# Patient Record
Sex: MALE | Race: WHITE | Employment: OTHER | RURAL
[De-identification: names, ages, dates, MRNs, and addresses within clinical notes are randomized per-mention and may not be internally consistent; named-entity substitution may affect disease eponyms.]

---

## 2017-01-01 ENCOUNTER — LAB ONLY (OUTPATIENT)
Dept: FAMILY MEDICINE CLINIC | Age: 82
End: 2017-01-01

## 2017-01-01 ENCOUNTER — OFFICE VISIT (OUTPATIENT)
Dept: FAMILY MEDICINE CLINIC | Age: 82
End: 2017-01-01

## 2017-01-01 ENCOUNTER — CLINICAL SUPPORT (OUTPATIENT)
Dept: FAMILY MEDICINE CLINIC | Age: 82
End: 2017-01-01

## 2017-01-01 ENCOUNTER — TELEPHONE (OUTPATIENT)
Dept: FAMILY MEDICINE CLINIC | Age: 82
End: 2017-01-01

## 2017-01-01 VITALS
TEMPERATURE: 97.4 F | WEIGHT: 150.8 LBS | RESPIRATION RATE: 16 BRPM | BODY MASS INDEX: 22.33 KG/M2 | HEIGHT: 69 IN | SYSTOLIC BLOOD PRESSURE: 100 MMHG | OXYGEN SATURATION: 99 % | HEART RATE: 89 BPM | DIASTOLIC BLOOD PRESSURE: 70 MMHG

## 2017-01-01 VITALS
DIASTOLIC BLOOD PRESSURE: 56 MMHG | HEART RATE: 90 BPM | RESPIRATION RATE: 16 BRPM | OXYGEN SATURATION: 98 % | SYSTOLIC BLOOD PRESSURE: 90 MMHG | WEIGHT: 151 LBS | BODY MASS INDEX: 22.3 KG/M2

## 2017-01-01 VITALS
HEART RATE: 95 BPM | WEIGHT: 149 LBS | OXYGEN SATURATION: 98 % | HEIGHT: 69 IN | BODY MASS INDEX: 22.07 KG/M2 | RESPIRATION RATE: 16 BRPM | SYSTOLIC BLOOD PRESSURE: 146 MMHG | DIASTOLIC BLOOD PRESSURE: 87 MMHG | TEMPERATURE: 96.2 F

## 2017-01-01 VITALS
OXYGEN SATURATION: 98 % | HEART RATE: 72 BPM | WEIGHT: 170 LBS | RESPIRATION RATE: 16 BRPM | SYSTOLIC BLOOD PRESSURE: 132 MMHG | DIASTOLIC BLOOD PRESSURE: 76 MMHG | BODY MASS INDEX: 25.1 KG/M2

## 2017-01-01 DIAGNOSIS — E11.9 TYPE 2 DIABETES MELLITUS WITHOUT COMPLICATION, WITHOUT LONG-TERM CURRENT USE OF INSULIN (HCC): Primary | ICD-10-CM

## 2017-01-01 DIAGNOSIS — E78.2 MIXED HYPERLIPIDEMIA: ICD-10-CM

## 2017-01-01 DIAGNOSIS — I10 ESSENTIAL HYPERTENSION: ICD-10-CM

## 2017-01-01 DIAGNOSIS — F32.89 OTHER DEPRESSION: ICD-10-CM

## 2017-01-01 DIAGNOSIS — R35.0 URINE FREQUENCY: Primary | ICD-10-CM

## 2017-01-01 DIAGNOSIS — R63.4 WEIGHT LOSS: ICD-10-CM

## 2017-01-01 DIAGNOSIS — E83.52 HYPERCALCEMIA: Primary | ICD-10-CM

## 2017-01-01 DIAGNOSIS — E11.9 TYPE 2 DIABETES MELLITUS WITHOUT COMPLICATION, WITHOUT LONG-TERM CURRENT USE OF INSULIN (HCC): ICD-10-CM

## 2017-01-01 DIAGNOSIS — Z23 ENCOUNTER FOR IMMUNIZATION: ICD-10-CM

## 2017-01-01 DIAGNOSIS — G30.1 LATE ONSET ALZHEIMER'S DISEASE WITH BEHAVIORAL DISTURBANCE (HCC): ICD-10-CM

## 2017-01-01 DIAGNOSIS — F02.818 LATE ONSET ALZHEIMER'S DISEASE WITH BEHAVIORAL DISTURBANCE (HCC): ICD-10-CM

## 2017-01-01 DIAGNOSIS — I25.119 CORONARY ARTERY DISEASE WITH ANGINA PECTORIS, UNSPECIFIED VESSEL OR LESION TYPE, UNSPECIFIED WHETHER NATIVE OR TRANSPLANTED HEART (HCC): ICD-10-CM

## 2017-01-01 LAB
ALBUMIN SERPL ELPH-MCNC: 3.9 G/DL (ref 2.9–4.4)
ALBUMIN SERPL-MCNC: 4.1 G/DL (ref 3.5–4.7)
ALBUMIN SERPL-MCNC: 4.5 G/DL (ref 3.5–4.7)
ALBUMIN SERPL-MCNC: 4.5 G/DL (ref 3.5–4.7)
ALBUMIN/GLOB SERPL: 1.4 {RATIO} (ref 0.7–1.7)
ALBUMIN/GLOB SERPL: 1.6 {RATIO} (ref 1.2–2.2)
ALBUMIN/GLOB SERPL: 1.9 {RATIO} (ref 1.2–2.2)
ALBUMIN/GLOB SERPL: 2 {RATIO} (ref 1.2–2.2)
ALP SERPL-CCNC: 116 IU/L (ref 39–117)
ALP SERPL-CCNC: 122 IU/L (ref 39–117)
ALP SERPL-CCNC: 48 IU/L (ref 39–117)
ALPHA1 GLOB SERPL ELPH-MCNC: 0.3 G/DL (ref 0–0.4)
ALPHA2 GLOB SERPL ELPH-MCNC: 0.8 G/DL (ref 0.4–1)
ALT SERPL-CCNC: 16 IU/L (ref 0–44)
ALT SERPL-CCNC: 20 IU/L (ref 0–44)
ALT SERPL-CCNC: 29 IU/L (ref 0–44)
AST SERPL-CCNC: 15 IU/L (ref 0–40)
AST SERPL-CCNC: 20 IU/L (ref 0–40)
AST SERPL-CCNC: 23 IU/L (ref 0–40)
B-GLOBULIN SERPL ELPH-MCNC: 1 G/DL (ref 0.7–1.3)
BASOPHILS # BLD AUTO: 0 X10E3/UL (ref 0–0.2)
BASOPHILS # BLD AUTO: 0 X10E3/UL (ref 0–0.2)
BASOPHILS NFR BLD AUTO: 0 %
BASOPHILS NFR BLD AUTO: 1 %
BILIRUB SERPL-MCNC: 0.4 MG/DL (ref 0–1.2)
BILIRUB SERPL-MCNC: 0.5 MG/DL (ref 0–1.2)
BILIRUB SERPL-MCNC: 0.7 MG/DL (ref 0–1.2)
BILIRUB UR QL STRIP: NEGATIVE
BUN SERPL-MCNC: 13 MG/DL (ref 8–27)
BUN SERPL-MCNC: 13 MG/DL (ref 8–27)
BUN SERPL-MCNC: 15 MG/DL (ref 8–27)
BUN/CREAT SERPL: 13 (ref 10–24)
BUN/CREAT SERPL: 14 (ref 10–24)
BUN/CREAT SERPL: 15 (ref 10–24)
CALCIUM SERPL-MCNC: 10.3 MG/DL (ref 8.6–10.2)
CALCIUM SERPL-MCNC: 10.3 MG/DL (ref 8.6–10.2)
CALCIUM SERPL-MCNC: 10.7 MG/DL (ref 8.6–10.2)
CHLORIDE SERPL-SCNC: 96 MMOL/L (ref 96–106)
CHLORIDE SERPL-SCNC: 97 MMOL/L (ref 96–106)
CHLORIDE SERPL-SCNC: 98 MMOL/L (ref 96–106)
CHOLEST SERPL-MCNC: 236 MG/DL (ref 100–199)
CHOLEST SERPL-MCNC: 244 MG/DL (ref 100–199)
CO2 SERPL-SCNC: 25 MMOL/L (ref 18–29)
CO2 SERPL-SCNC: 26 MMOL/L (ref 18–29)
CO2 SERPL-SCNC: 26 MMOL/L (ref 18–29)
CREAT SERPL-MCNC: 0.94 MG/DL (ref 0.76–1.27)
CREAT SERPL-MCNC: 1 MG/DL (ref 0.76–1.27)
CREAT SERPL-MCNC: 1.01 MG/DL (ref 0.76–1.27)
EOSINOPHIL # BLD AUTO: 0 X10E3/UL (ref 0–0.4)
EOSINOPHIL # BLD AUTO: 0.1 X10E3/UL (ref 0–0.4)
EOSINOPHIL NFR BLD AUTO: 1 %
EOSINOPHIL NFR BLD AUTO: 1 %
ERYTHROCYTE [DISTWIDTH] IN BLOOD BY AUTOMATED COUNT: 14.3 % (ref 12.3–15.4)
ERYTHROCYTE [DISTWIDTH] IN BLOOD BY AUTOMATED COUNT: 14.4 % (ref 12.3–15.4)
ERYTHROCYTE [SEDIMENTATION RATE] IN BLOOD BY WESTERGREN METHOD: 5 MM/HR (ref 0–30)
EST. AVERAGE GLUCOSE BLD GHB EST-MCNC: 105 MG/DL
EST. AVERAGE GLUCOSE BLD GHB EST-MCNC: 117 MG/DL
GAMMA GLOB SERPL ELPH-MCNC: 0.6 G/DL (ref 0.4–1.8)
GFR SERPLBLD CREATININE-BSD FMLA CKD-EPI: 69 ML/MIN/1.73
GFR SERPLBLD CREATININE-BSD FMLA CKD-EPI: 75 ML/MIN/1.73
GFR SERPLBLD CREATININE-BSD FMLA CKD-EPI: 80 ML/MIN/1.73
GFR SERPLBLD CREATININE-BSD FMLA CKD-EPI: 87 ML/MIN/1.73
GLOBULIN SER CALC-MCNC: 2.3 G/DL (ref 1.5–4.5)
GLOBULIN SER CALC-MCNC: 2.4 G/DL (ref 1.5–4.5)
GLOBULIN SER CALC-MCNC: 2.6 G/DL (ref 1.5–4.5)
GLOBULIN SER-MCNC: 2.8 G/DL (ref 2.2–3.9)
GLUCOSE SERPL-MCNC: 159 MG/DL (ref 65–99)
GLUCOSE SERPL-MCNC: 164 MG/DL (ref 65–99)
GLUCOSE SERPL-MCNC: 94 MG/DL (ref 65–99)
GLUCOSE UR-MCNC: NEGATIVE MG/DL
HBA1C MFR BLD: 5.3 % (ref 4.8–5.6)
HBA1C MFR BLD: 5.7 % (ref 4.8–5.6)
HCT VFR BLD AUTO: 38.9 % (ref 37.5–51)
HCT VFR BLD AUTO: 39.9 % (ref 37.5–51)
HDLC SERPL-MCNC: 39 MG/DL
HDLC SERPL-MCNC: 42 MG/DL
HGB BLD-MCNC: 12.8 G/DL (ref 12.6–17.7)
HGB BLD-MCNC: 13.8 G/DL (ref 12.6–17.7)
IGA SERPL-MCNC: 147 MG/DL (ref 61–437)
IGG SERPL-MCNC: 667 MG/DL (ref 700–1600)
IGM SERPL-MCNC: 31 MG/DL (ref 15–143)
IMM GRANULOCYTES # BLD: 0 X10E3/UL (ref 0–0.1)
IMM GRANULOCYTES # BLD: 0 X10E3/UL (ref 0–0.1)
IMM GRANULOCYTES NFR BLD: 1 %
IMM GRANULOCYTES NFR BLD: 1 %
INTERPRETATION SERPL IEP-IMP: ABNORMAL
KAPPA LC FREE SER-MCNC: 17.3 MG/L (ref 3.3–19.4)
KAPPA LC FREE/LAMBDA FREE SER: 1.38 {RATIO} (ref 0.26–1.65)
KETONES P FAST UR STRIP-MCNC: NEGATIVE MG/DL
LAMBDA LC FREE SERPL-MCNC: 12.5 MG/L (ref 5.7–26.3)
LDLC SERPL CALC-MCNC: 165 MG/DL (ref 0–99)
LDLC SERPL CALC-MCNC: 174 MG/DL (ref 0–99)
LYMPHOCYTES # BLD AUTO: 1.1 X10E3/UL (ref 0.7–3.1)
LYMPHOCYTES # BLD AUTO: 1.5 X10E3/UL (ref 0.7–3.1)
LYMPHOCYTES NFR BLD AUTO: 26 %
LYMPHOCYTES NFR BLD AUTO: 32 %
M PROTEIN SERPL ELPH-MCNC: ABNORMAL G/DL
MCH RBC QN AUTO: 29.8 PG (ref 26.6–33)
MCH RBC QN AUTO: 30.3 PG (ref 26.6–33)
MCHC RBC AUTO-ENTMCNC: 32.9 G/DL (ref 31.5–35.7)
MCHC RBC AUTO-ENTMCNC: 34.6 G/DL (ref 31.5–35.7)
MCV RBC AUTO: 88 FL (ref 79–97)
MCV RBC AUTO: 91 FL (ref 79–97)
MONOCYTES # BLD AUTO: 0.5 X10E3/UL (ref 0.1–0.9)
MONOCYTES # BLD AUTO: 0.5 X10E3/UL (ref 0.1–0.9)
MONOCYTES NFR BLD AUTO: 10 %
MONOCYTES NFR BLD AUTO: 12 %
NEUTROPHILS # BLD AUTO: 2.5 X10E3/UL (ref 1.4–7)
NEUTROPHILS # BLD AUTO: 2.6 X10E3/UL (ref 1.4–7)
NEUTROPHILS NFR BLD AUTO: 56 %
NEUTROPHILS NFR BLD AUTO: 59 %
PH UR STRIP: 6 [PH] (ref 4.6–8)
PLATELET # BLD AUTO: 189 X10E3/UL (ref 150–379)
PLATELET # BLD AUTO: 231 X10E3/UL (ref 150–379)
PLEASE NOTE:, 149534: ABNORMAL
POTASSIUM SERPL-SCNC: 4.7 MMOL/L (ref 3.5–5.2)
POTASSIUM SERPL-SCNC: 5 MMOL/L (ref 3.5–5.2)
POTASSIUM SERPL-SCNC: 5.3 MMOL/L (ref 3.5–5.2)
PROT SERPL-MCNC: 6.7 G/DL (ref 6–8.5)
PROT SERPL-MCNC: 6.8 G/DL (ref 6–8.5)
PROT SERPL-MCNC: 6.9 G/DL (ref 6–8.5)
PROT UR QL STRIP: NEGATIVE
RBC # BLD AUTO: 4.3 X10E6/UL (ref 4.14–5.8)
RBC # BLD AUTO: 4.56 X10E6/UL (ref 4.14–5.8)
SODIUM SERPL-SCNC: 139 MMOL/L (ref 134–144)
SODIUM SERPL-SCNC: 141 MMOL/L (ref 134–144)
SODIUM SERPL-SCNC: 141 MMOL/L (ref 134–144)
SP GR UR STRIP: 1.03 (ref 1–1.03)
TRIGL SERPL-MCNC: 147 MG/DL (ref 0–149)
TRIGL SERPL-MCNC: 157 MG/DL (ref 0–149)
TSH SERPL DL<=0.005 MIU/L-ACNC: 2.81 UIU/ML (ref 0.45–4.5)
UA UROBILINOGEN AMB POC: NORMAL (ref 0.2–1)
URINALYSIS CLARITY POC: CLEAR
URINALYSIS COLOR POC: NORMAL
URINE BLOOD POC: NEGATIVE
URINE LEUKOCYTES POC: NEGATIVE
URINE NITRITES POC: NEGATIVE
VLDLC SERPL CALC-MCNC: 29 MG/DL (ref 5–40)
VLDLC SERPL CALC-MCNC: 31 MG/DL (ref 5–40)
WBC # BLD AUTO: 4.4 X10E3/UL (ref 3.4–10.8)
WBC # BLD AUTO: 4.5 X10E3/UL (ref 3.4–10.8)

## 2017-01-01 RX ORDER — DIVALPROEX SODIUM 250 MG/1
250 TABLET, DELAYED RELEASE ORAL 2 TIMES DAILY
Qty: 180 TAB | Refills: 3 | Status: SHIPPED | OUTPATIENT
Start: 2017-01-01 | End: 2017-01-01 | Stop reason: ALTCHOICE

## 2017-01-01 RX ORDER — METFORMIN HYDROCHLORIDE 1000 MG/1
1000 TABLET ORAL DAILY
Qty: 90 TAB | Refills: 4 | Status: SHIPPED | OUTPATIENT
Start: 2017-01-01 | End: 2017-01-01 | Stop reason: ALTCHOICE

## 2017-01-01 RX ORDER — VENLAFAXINE 37.5 MG/1
37.5 TABLET ORAL 2 TIMES DAILY
Qty: 60 TAB | Refills: 6 | Status: SHIPPED | OUTPATIENT
Start: 2017-01-01 | End: 2017-01-01 | Stop reason: DRUGHIGH

## 2017-01-01 RX ORDER — VENLAFAXINE 75 MG/1
75 TABLET ORAL 2 TIMES DAILY
Qty: 60 TAB | Refills: 11 | Status: SHIPPED | OUTPATIENT
Start: 2017-01-01

## 2017-01-01 RX ORDER — GLIMEPIRIDE 1 MG/1
1 TABLET ORAL
Qty: 90 TAB | Refills: 3 | Status: SHIPPED | OUTPATIENT
Start: 2017-01-01 | End: 2017-01-01 | Stop reason: ALTCHOICE

## 2017-01-01 RX ORDER — DIVALPROEX SODIUM 250 MG/1
250 TABLET, DELAYED RELEASE ORAL 2 TIMES DAILY
Qty: 60 TAB | Refills: 11 | Status: SHIPPED | OUTPATIENT
Start: 2017-01-01 | End: 2017-01-01 | Stop reason: SDUPTHER

## 2017-01-01 RX ORDER — VENLAFAXINE 37.5 MG/1
37.5 TABLET ORAL 2 TIMES DAILY
Qty: 60 TAB | Refills: 6 | Status: SHIPPED | OUTPATIENT
Start: 2017-01-01 | End: 2017-01-01 | Stop reason: SDUPTHER

## 2017-01-01 RX ORDER — BISOPROLOL FUMARATE AND HYDROCHLOROTHIAZIDE 5; 6.25 MG/1; MG/1
1 TABLET ORAL DAILY
Qty: 90 TAB | Refills: 4 | Status: SHIPPED | OUTPATIENT
Start: 2017-01-01 | End: 2017-01-01 | Stop reason: ALTCHOICE

## 2017-01-01 RX ORDER — TAMSULOSIN HYDROCHLORIDE 0.4 MG/1
0.4 CAPSULE ORAL DAILY
Qty: 90 CAP | Refills: 4 | Status: SHIPPED | OUTPATIENT
Start: 2017-01-01

## 2017-02-08 ENCOUNTER — LAB ONLY (OUTPATIENT)
Dept: FAMILY MEDICINE CLINIC | Age: 82
End: 2017-02-08

## 2017-02-08 DIAGNOSIS — I10 ESSENTIAL HYPERTENSION: ICD-10-CM

## 2017-02-08 DIAGNOSIS — E11.9 TYPE 2 DIABETES MELLITUS WITHOUT COMPLICATION, WITHOUT LONG-TERM CURRENT USE OF INSULIN (HCC): Primary | ICD-10-CM

## 2017-02-08 DIAGNOSIS — E78.2 MIXED HYPERLIPIDEMIA: ICD-10-CM

## 2017-02-08 NOTE — MR AVS SNAPSHOT
Visit Information Date & Time Provider Department Dept. Phone Encounter #  
 2/8/2017 10:15 AM Yany 32 [id number] Your Appointments 2/10/2017  9:30 AM  
ESTABLISHED PATIENT with MD Clifton Rodas ([facility]) Appt Note: 6 mo f/u  
 1000 [facility] [address] [facility],5[address] [id number] [phone]  
  
   
 [address] [address] Upcoming Health Maintenance Date Due  
 FOOT EXAM Q1 9/15/1945 MICROALBUMIN Q1 9/15/1945 EYE EXAM RETINAL OR DILATED Q1 9/15/1945 GLAUCOMA SCREENING Q2Y 9/15/2000 DTaP/Tdap/Td series (1 - Tdap) 4/2/2013 HEMOGLOBIN A1C Q6M 2/2/2017 MEDICARE YEARLY EXAM 2/5/2017 LIPID PANEL Q1 8/2/2017 Allergies as of 2/8/2017  Review Complete On: 10/14/2016 By: Prisca Urias NP No Known Allergies Current Immunizations  Never Reviewed Name Date Influenza Vaccine 1/14/2017, 10/14/2015 10:02 AM, 10/9/2014 Pneumococcal Conjugate (PCV-13) 2/5/2016 11:03 AM  
 Pneumococcal Vaccine (Unspecified Type) 4/1/2013 Td 4/1/2013 Zoster Vaccine, Live 10/4/2012 Not reviewed this visit You Were Diagnosed With   
  
 Codes Comments Type 2 diabetes mellitus without complication, without long-term current use of insulin (HCC)    -  Primary ICD-10-CM: E11.9 ICD-9-CM: 250.00 Mixed hyperlipidemia     ICD-10-CM: E78.2 ICD-9-CM: 272.2 Essential hypertension     ICD-10-CM: I10 
ICD-9-CM: 401.9 Vitals Smoking Status Former Smoker Preferred Pharmacy Pharmacy Name Phone  [facility] [phone] Your Updated Medication List  
  
   
This list is accurate as of: 2/8/17 11:24 AM.  Always use your most recent med list. amLODIPine 10 mg tablet Commonly known as:  Kip Ray TAKE 1 TABLET DAILY aspirin delayed-release 81 mg tablet Take  by mouth daily. bisoprolol-hydroCHLOROthiazide 5-6.25 mg per tablet Commonly known as:  [facility] Take 1 Tab by mouth daily. cholecalciferol (vitamin D3) 2,000 unit Tab Take  by mouth. dutasteride 0.5 mg capsule Commonly known as:  AVODART TAKE 1 CAPSULE DAILY  
  
 glucose blood VI test strips strip Commonly known as:  ASCENSIA AUTODISC VI, ONE TOUCH ULTRA TEST VI  
100  
  
 metFORMIN 1,000 mg tablet Commonly known as:  GLUCOPHAGE  
TAKE 1 TABLET TWICE DAILY  WITH MEALS  
  
 OMEGA 3 FISH OIL PO Take  by mouth two (2) times a day. Indications: Total Cardio cover  
  
 omeprazole 20 mg capsule Commonly known as:  PRILOSEC Take 20 mg by mouth daily. quinapril 40 mg tablet Commonly known as:  ACCUPRIL Take 1 Tab by mouth daily. sertraline 50 mg tablet Commonly known as:  ZOLOFT Take one and one half tablets daily  
  
 simvastatin 20 mg tablet Commonly known as:  ZOCOR Take 1 Tab by mouth daily. spironolactone 25 mg tablet Commonly known as:  ALDACTONE Take 1 Tab by mouth daily. tamsulosin 0.4 mg capsule Commonly known as:  FLOMAX Take 1 Cap by mouth daily. We Performed the Following CBC WITH AUTOMATED DIFF [63836 CPT(R)] HEMOGLOBIN A1C WITH EAG [35640 CPT(R)] LIPID PANEL [01920 CPT(R)] METABOLIC PANEL, COMPREHENSIVE [32435 CPT(R)] TX COLLECTION VENOUS BLOOD,VENIPUNCTURE [id number] CPT(R)] TX HANDLG&/OR CONVEY OF SPEC FOR TR OFFICE TO LAB [52214 CPT(R)] Patient Instructions If you have any questions regarding [employer], you may call [employer] support at [phone]. Introducing [facility] & HEALTH SERVICES! Dear Katelin Balderas: Thank you for requesting a [employer] account. Our records indicate that you already have an active [employer] account. You can access your account anytime at [email or web address] Did you know that you can access your hospital and ER discharge instructions at any time in [employer]? You can also review all of your test results from your hospital stay or ER visit. Additional Information If you have questions, please visit the Frequently Asked Questions section of the [employer] website at [email or web address] Remember, [employer] is NOT to be used for urgent needs. For medical emergencies, dial 911. Now available from your iPhone and Android! Please provide this summary of care documentation to your next provider. Your primary care clinician is listed as Daisy Palacios. If you have any questions after today's visit, please call [phone].

## 2017-02-09 LAB
ALBUMIN SERPL-MCNC: 4.4 G/DL (ref 3.5–4.7)
ALBUMIN/GLOB SERPL: 1.9 {RATIO} (ref 1.1–2.5)
ALP SERPL-CCNC: 56 IU/L (ref 39–117)
ALT SERPL-CCNC: 32 IU/L (ref 0–44)
AST SERPL-CCNC: 21 IU/L (ref 0–40)
BASOPHILS # BLD AUTO: 0 X10E3/UL (ref 0–0.2)
BASOPHILS NFR BLD AUTO: 1 %
BILIRUB SERPL-MCNC: 0.4 MG/DL (ref 0–1.2)
BUN SERPL-MCNC: 19 MG/DL (ref 8–27)
BUN/CREAT SERPL: 20 (ref 10–22)
CALCIUM SERPL-MCNC: 9.9 MG/DL (ref 8.6–10.2)
CHLORIDE SERPL-SCNC: 99 MMOL/L (ref 96–106)
CHOLEST SERPL-MCNC: 168 MG/DL (ref 100–199)
CO2 SERPL-SCNC: 22 MMOL/L (ref 18–29)
CREAT SERPL-MCNC: 0.97 MG/DL (ref 0.76–1.27)
EOSINOPHIL # BLD AUTO: 0.1 X10E3/UL (ref 0–0.4)
EOSINOPHIL NFR BLD AUTO: 2 %
ERYTHROCYTE [DISTWIDTH] IN BLOOD BY AUTOMATED COUNT: 14.3 % (ref 12.3–15.4)
EST. AVERAGE GLUCOSE BLD GHB EST-MCNC: 154 MG/DL
GLOBULIN SER CALC-MCNC: 2.3 G/DL (ref 1.5–4.5)
GLUCOSE SERPL-MCNC: 188 MG/DL (ref 65–99)
HBA1C MFR BLD: 7 % (ref 4.8–5.6)
HCT VFR BLD AUTO: 39.5 % (ref 37.5–51)
HDLC SERPL-MCNC: 36 MG/DL
HGB BLD-MCNC: 13.1 G/DL (ref 12.6–17.7)
IMM GRANULOCYTES # BLD: 0 X10E3/UL (ref 0–0.1)
IMM GRANULOCYTES NFR BLD: 1 %
LDLC SERPL CALC-MCNC: 92 MG/DL (ref 0–99)
LYMPHOCYTES # BLD AUTO: 1.3 X10E3/UL (ref 0.7–3.1)
LYMPHOCYTES NFR BLD AUTO: 28 %
MCH RBC QN AUTO: 28.6 PG (ref 26.6–33)
MCHC RBC AUTO-ENTMCNC: 33.2 G/DL (ref 31.5–35.7)
MCV RBC AUTO: 86 FL (ref 79–97)
MONOCYTES # BLD AUTO: 0.3 X10E3/UL (ref 0.1–0.9)
MONOCYTES NFR BLD AUTO: 7 %
NEUTROPHILS # BLD AUTO: 2.9 X10E3/UL (ref 1.4–7)
NEUTROPHILS NFR BLD AUTO: 61 %
PLATELET # BLD AUTO: 183 X10E3/UL (ref 150–379)
POTASSIUM SERPL-SCNC: 4.4 MMOL/L (ref 3.5–5.2)
PROT SERPL-MCNC: 6.7 G/DL (ref 6–8.5)
RBC # BLD AUTO: 4.58 X10E6/UL (ref 4.14–5.8)
SODIUM SERPL-SCNC: 139 MMOL/L (ref 134–144)
TRIGL SERPL-MCNC: 202 MG/DL (ref 0–149)
VLDLC SERPL CALC-MCNC: 40 MG/DL (ref 5–40)
WBC # BLD AUTO: 4.7 X10E3/UL (ref 3.4–10.8)

## 2017-02-10 ENCOUNTER — OFFICE VISIT (OUTPATIENT)
Dept: FAMILY MEDICINE CLINIC | Age: 82
End: 2017-02-10

## 2017-02-10 VITALS
DIASTOLIC BLOOD PRESSURE: 68 MMHG | SYSTOLIC BLOOD PRESSURE: 118 MMHG | HEART RATE: 77 BPM | WEIGHT: 185.8 LBS | BODY MASS INDEX: 27.44 KG/M2 | OXYGEN SATURATION: 98 % | RESPIRATION RATE: 17 BRPM

## 2017-02-10 DIAGNOSIS — Z71.89 ADVANCED CARE PLANNING/COUNSELING DISCUSSION: ICD-10-CM

## 2017-02-10 DIAGNOSIS — E11.9 TYPE 2 DIABETES MELLITUS WITHOUT COMPLICATION, WITHOUT LONG-TERM CURRENT USE OF INSULIN (HCC): ICD-10-CM

## 2017-02-10 DIAGNOSIS — Z00.00 MEDICARE ANNUAL WELLNESS VISIT, SUBSEQUENT: Primary | ICD-10-CM

## 2017-02-10 RX ORDER — AMLODIPINE BESYLATE 10 MG/1
10 TABLET ORAL DAILY
Qty: 90 TAB | Refills: 4 | Status: SHIPPED | OUTPATIENT
Start: 2017-02-10 | End: 2017-01-01 | Stop reason: ALTCHOICE

## 2017-02-10 RX ORDER — GLIMEPIRIDE 1 MG/1
TABLET ORAL
COMMUNITY
End: 2017-01-01 | Stop reason: SDUPTHER

## 2017-02-10 NOTE — MR AVS SNAPSHOT
Visit Information Date & Time Provider Department Dept. Phone Encounter #  
 2/10/2017  9:30 AM Ryanne Mondragon MD [address] [place] [address] [id number] Upcoming Health Maintenance Date Due  
 FOOT EXAM Q1 9/15/1945 MICROALBUMIN Q1 9/15/1945 EYE EXAM RETINAL OR DILATED Q1 9/15/1945 GLAUCOMA SCREENING Q2Y 9/15/2000 DTaP/Tdap/Td series (1 - Tdap) 4/2/2013 MEDICARE YEARLY EXAM 2/5/2017 HEMOGLOBIN A1C Q6M 8/8/2017 LIPID PANEL Q1 2/8/2018 Allergies as of 2/10/2017  Review Complete On: 2/10/2017 By: Miranda Hagen No Known Allergies Current Immunizations  Never Reviewed Name Date Influenza Vaccine 1/14/2017, 10/14/2015 10:02 AM, 10/9/2014 Pneumococcal Conjugate (PCV-13) 2/5/2016 11:03 AM  
 Pneumococcal Vaccine (Unspecified Type) 4/1/2013 Td 4/1/2013 Zoster Vaccine, Live 10/4/2012 Not reviewed this visit Vitals BP Pulse Resp Weight(growth percentile) SpO2 BMI  
 118/68 (BP 1 Location: Right arm, BP Patient Position: Sitting) 77 17 185 lb 12.8 oz (84.3 kg) 98% 27.44 kg/m2 Smoking Status Former Smoker Vitals History BMI and BSA Data Body Mass Index Body Surface Area  
 27.44 kg/m 2 2.03 m 2 Preferred Pharmacy Pharmacy Name Phone  [address] [phone] Your Updated Medication List  
  
   
This list is accurate as of: 2/10/17  9:41 AM.  Always use your most recent med list. amLODIPine 10 mg tablet Commonly known as:  Wandra Hoda TAKE 1 TABLET DAILY  
  
 aspirin delayed-release 81 mg tablet Take  by mouth daily. bisoprolol-hydroCHLOROthiazide 5-6.25 mg per tablet Commonly known as:  [facility] Take 1 Tab by mouth daily. cholecalciferol (vitamin D3) 2,000 unit Tab Take  by mouth. dutasteride 0.5 mg capsule Commonly known as:  AVODART TAKE 1 CAPSULE DAILY  
  
 glimepiride 1 mg tablet Commonly known as:  AMARYL Take  by mouth Daily (before breakfast). glucose blood VI test strips strip Commonly known as:  ASCENSIA AUTODISC VI, ONE TOUCH ULTRA TEST VI  
100  
  
 metFORMIN 1,000 mg tablet Commonly known as:  GLUCOPHAGE  
TAKE 1 TABLET TWICE DAILY  WITH MEALS  
  
 OMEGA 3 FISH OIL PO Take  by mouth two (2) times a day. Indications: Total Cardio cover  
  
 omeprazole 20 mg capsule Commonly known as:  PRILOSEC Take 20 mg by mouth daily. quinapril 40 mg tablet Commonly known as:  ACCUPRIL Take 1 Tab by mouth daily. simvastatin 20 mg tablet Commonly known as:  ZOCOR Take 1 Tab by mouth daily. spironolactone 25 mg tablet Commonly known as:  ALDACTONE Take 1 Tab by mouth daily. tamsulosin 0.4 mg capsule Commonly known as:  FLOMAX Take 1 Cap by mouth daily. Introducing [facility] & HEALTH SERVICES! Dear Anju Solis: Thank you for requesting a [employer] account. Our records indicate that you already have an active [employer] account. You can access your account anytime at [email or web address] Did you know that you can access your hospital and ER discharge instructions at any time in [employer]? You can also review all of your test results from your hospital stay or ER visit. Additional Information If you have questions, please visit the Frequently Asked Questions section of the [employer] website at [email or web address] Remember, [employer] is NOT to be used for urgent needs. For medical emergencies, dial 911. Now available from your iPhone and Android! Please provide this summary of care documentation to your next provider. Your primary care clinician is listed as Maia Mendosa. If you have any questions after today's visit, please call [phone].

## 2017-02-10 NOTE — PROGRESS NOTES
Chief Complaint   Patient presents with    Annual Wellness Visit    Medication Refill     Amlodipine          HPI:      Ranjan Faust is a 80 y.o. male who has a history of AODM. He has experienced suboptimal control in the past and since his last evaluation in July, he reports that he has worked hard to improve his A1c. We reviewed his labs today. His A1c is 7. He is due for SAWV    HTN and Hyperlipidemia are treated and he reports compliance with medication and reports no side effects. No Known Allergies    Current Outpatient Prescriptions   Medication Sig    glimepiride (AMARYL) 1 mg tablet Take  by mouth Daily (before breakfast).  metFORMIN (GLUCOPHAGE) 1,000 mg tablet TAKE 1 TABLET TWICE DAILY  WITH MEALS    quinapril (ACCUPRIL) 40 mg tablet Take 1 Tab by mouth daily.  spironolactone (ALDACTONE) 25 mg tablet Take 1 Tab by mouth daily.  dutasteride (AVODART) 0.5 mg capsule TAKE 1 CAPSULE DAILY    simvastatin (ZOCOR) 20 mg tablet Take 1 Tab by mouth daily.  bisoprolol-hydrochlorothiazide (ZIAC) 5-6.25 mg per tablet Take 1 Tab by mouth daily.  tamsulosin (FLOMAX) 0.4 mg capsule Take 1 Cap by mouth daily.  glucose blood VI test strips (ASCENSIA AUTODISC VI, ONE TOUCH ULTRA TEST VI) strip 100    cholecalciferol, vitamin D3, 2,000 unit tab Take  by mouth.  omeprazole (PRILOSEC) 20 mg capsule Take 20 mg by mouth daily.  aspirin delayed-release 81 mg tablet Take  by mouth daily.  OMEGA-3 FATTY ACIDS/FISH OIL (OMEGA 3 FISH OIL PO) Take  by mouth two (2) times a day. Indications: Total Cardio cover    amLODIPine (NORVASC) 10 mg tablet TAKE 1 TABLET DAILY     No current facility-administered medications for this visit.         Past Medical History   Diagnosis Date    BPH (benign prostatic hypertrophy) 1994    CAD (coronary artery disease) 2001    Dementia     DM [facility]diabetes mellitus), type 2 ([facility] [address] 1995    HTN (hypertension)     Hyperlipidemia 2001    PVD (peripheral vascular disease) ([facility] [address]      diminished pulse left foot    Systolic murmur          ROS:  Denies fever, chills, cough, chest pain, SOB,  nausea, vomiting, or diarrhea. Denies wt loss, wt gain, hemoptysis, hematochezia or melena. Wt Readings from Last 3 Encounters:   02/10/17 185 lb 12.8 oz (84.3 kg)   09/08/16 183 lb (83 kg)   09/06/16 183 lb (83 kg)       Physical Examination:    Visit Vitals    /68 (BP 1 Location: Right arm, BP Patient Position: Sitting)    Pulse 77    Resp 17    Wt 185 lb 12.8 oz (84.3 kg)    SpO2 98%    BMI 27.44 kg/m2     General:  alert, cooperative, appears stated age    Oropharynx:  lips, mucosa, and tongue normal; teeth and gums normal    Eyes:  conjunctivae/corneas clear. PERRL, EOM's intact. Fundi benign. Ears:  normal TM's and external ear canals both ears    Neck:  supple, symmetrical, trachea midline, no adenopathy, no carotid bruit, no JVD and thyroid not enlarged, symmetric, no tenderness/mass/nodules    Thyroid:  no palpable nodule    Lung:  clear to auscultation bilaterally    Heart:  regular rate and rhythm, systolic murmur: holosystolic 2/6, rumbling at apex    Abdomen:  soft, non-tender; bowel sounds normal; no masses, no organomegaly    Extremities:  extremities normal, atraumatic, no cyanosis or edema    Skin:      Diabetic Foot Exam:  Both feet are examined and demonstrate intact DP pulses. There is good capillary refill and sensation is intact. Skin is intact and there are no ulcers or sores on the left foot; however, the right foot has 2 ulcers . Bucky [place] Pulses:  2+ and symmetric    Neuro:  normal without focal findings  BILLY  reflexes normal and symmetric  mental status, speech normal, alert and oriented x3      Lab Results   Component Value Date/Time    Hemoglobin A1c 7.0 02/08/2017 10:58 AM    Hemoglobin A1c 6.9 08/02/2016 09:40 AM    Hemoglobin A1c 7.5 02/02/2016 09:51 AM           ASSESSMENT AND PLAN:     1. AODM:  Nice improvement.   Continue efforts and reassess in 90 days;  Buy new shoes with a much wider toe box today. See podiatry next week. RTC in [place] in 3 weeks. 2.  SAWV today  3. ACP update  4.  BP is at goal    Orders Placed This Encounter    HM DIABETES FOOT EXAM    glimepiride (AMARYL) 1 mg tablet     Sig: Take  by mouth Daily (before breakfast). Portia Del Toro MD, FACP      ______________________________________________________________________    Kari Sharpe is a 80 y.o. male and presents for annual Medicare Wellness Visit. Problem List: Reviewed with patient and discussed risk factors. Patient Active Problem List   Diagnosis Code    Basal cell carcinoma of chest wall C44.519    Type 2 diabetes mellitus without complication (HCC) G02.6    DM (diabetes mellitus), type 2 ([facility] [address] E11.9    CAD (coronary artery disease) I25.10    Hyperlipidemia E78.5    Advanced care planning/counseling discussion Z71.89       Current medical providers:  Patient Care Team:  Adam Christine MD as PCP - General (Internal Medicine)    [facility], , Medications/Allergies: reviewed, on chart. Male Alcohol Screening: On any occasion during the past 3 months, have you had more than 4 drinks containing alcohol? No    Do you average more than 14 drinks per week? No    ROS:  Constitutional: No fever, chills or weight loss  Respiratory: No cough, SOB   CV: No chest pain or Palpitations    Objective:  Visit Vitals    /68 (BP 1 Location: Right arm, BP Patient Position: Sitting)    Pulse 77    Resp 17    Wt 185 lb 12.8 oz (84.3 kg)    SpO2 98%    BMI 27.44 kg/m2    Body mass index is 27.44 kg/(m^2). Assessment of cognitive impairment: Alert and oriented x 3    Depression Screen:   PHQ 2 / 9, over the last two weeks 9/8/2016   Little interest or pleasure in doing things Not at all   Feeling down, depressed or hopeless Not at all   Total Score PHQ 2 0       Fall Risk Assessment:    Fall Risk Assessment, last 12 mths 9/8/2016   Able to walk? Yes   Fall in past 12 months? No   Fall with injury? -   Number of falls in past 12 months -   Fall Risk Score -       Functional Ability:   Does the patient exhibit a steady gait? yes   How long did it take the patient to get up and walk from a sitting position? 12 sec   Is the patient self reliant?  (ie can do own laundry, meals, household chores)  yes     Does the patient handle his/her own medications? yes     Does the patient handle his/her own money? yes     Is the patients home safe (ie good lighting, handrails on stairs and bath, etc.)? yes     Did you notice or did patient express any hearing difficulties? yes     Did you notice or did patient express any vision difficulties? no       Advance Care Planning:   Patient was offered the opportunity to discuss advance care planning:  yes     Does patient have an Advance Directive:  no   If no, did you provide information on Caring Connections?  no       Plan:      Orders Placed This Encounter     DIABETES FOOT EXAM    glimepiride (AMARYL) 1 mg tablet       Health Maintenance   Topic Date Due    FOOT EXAM Q1  09/15/1945    MICROALBUMIN Q1  09[birth year]15/1945    EYE EXAM RETINAL OR DILATED Q1  09[birth year]/1945    GLAUCOMA SCREENING Q2Y  09/15/2000    DTaP/Tdap/Td series (1 - Tdap) 04/02/2013    MEDICARE YEARLY EXAM  02/05/2017    HEMOGLOBIN A1C Q6M  08/08/2017    LIPID PANEL Q1  02/08/2018    ZOSTER VACCINE AGE 60>  Completed    Pneumococcal 65+ Low/Medium Risk  Completed    INFLUENZA AGE 9 TO ADULT  Completed       *Patient verbalized understanding and agreement with the plan. A copy of the After Visit Summary with personalized health plan was given to the patient today.

## 2017-02-10 NOTE — ACP (ADVANCE CARE PLANNING)
Mr Samreen Fontana does not have an AD or LW. In the event that he is unable to speak for himself, he designates his wife, Nicole Plata, to speak for him. She can be reached at [phone] (H) or [phone] (c).     Triston Albrecht

## 2017-02-21 ENCOUNTER — TELEPHONE (OUTPATIENT)
Dept: FAMILY MEDICINE CLINIC | Age: 82
End: 2017-02-21

## 2017-02-21 RX ORDER — SPIRONOLACTONE 25 MG/1
25 TABLET ORAL 2 TIMES DAILY
Qty: 180 TAB | Refills: 3 | Status: SHIPPED | OUTPATIENT
Start: 2017-02-21 | End: 2017-01-01 | Stop reason: ALTCHOICE

## 2017-08-08 NOTE — PROGRESS NOTES
Patient came in for routine bloodwork, for upcoming appointment with Dr. John Sanchez. CMP,CBC,A1C, and Lipid drawn. No vitals taken.    [facility] [place] [employer] LPN

## 2017-08-11 PROBLEM — E11.9 TYPE 2 DIABETES MELLITUS WITHOUT COMPLICATION, WITHOUT LONG-TERM CURRENT USE OF INSULIN (HCC): Status: ACTIVE | Noted: 2017-01-01

## 2017-08-11 NOTE — ACP (ADVANCE CARE PLANNING)
Patient states they do not have an Advance directive, they have a will. Given [facility] Medical Directive forms.

## 2017-08-11 NOTE — PROGRESS NOTES
Chief Complaint   Patient presents with    Diabetes         HPI:      Ashlie Wilson is a 80 y.o. male who has a history of AODM. He has experienced suboptimal control in the past and since his last evaluation in July, he reports that he has worked hard to improve his A1c. We reviewed his labs today. HTN and Hyperlipidemia are treated and he reports compliance with medication and reports no side effects. No Known Allergies    Current Outpatient Prescriptions   Medication Sig    tamsulosin (FLOMAX) 0.4 mg capsule Take 1 Cap by mouth daily.  divalproex DR (DEPAKOTE) 250 mg tablet Take 1 Tab by mouth two (2) times a day.  glimepiride (AMARYL) 1 mg tablet Take 1 Tab by mouth Daily (before breakfast).  bisoprolol-hydroCHLOROthiazide (ZIAC) 5-6.25 mg per tablet Take 1 Tab by mouth daily.  spironolactone (ALDACTONE) 25 mg tablet Take 1 Tab by mouth two (2) times a day.  metFORMIN (GLUCOPHAGE) 1,000 mg tablet TAKE 1 TABLET TWICE DAILY  WITH MEALS    dutasteride (AVODART) 0.5 mg capsule TAKE 1 CAPSULE DAILY    glucose blood VI test strips (ASCENSIA AUTODISC VI, ONE TOUCH ULTRA TEST VI) strip 100    amLODIPine (NORVASC) 10 mg tablet Take 1 Tab by mouth daily.  quinapril (ACCUPRIL) 40 mg tablet Take 1 Tab by mouth daily.  omeprazole (PRILOSEC) 20 mg capsule Take 20 mg by mouth daily.  aspirin delayed-release 81 mg tablet Take  by mouth daily. No current facility-administered medications for this visit. Past Medical History:   Diagnosis Date    BPH (benign prostatic hypertrophy) 1994    CAD (coronary artery disease) 2001    Dementia     [facility]    HTN (hypertension)     Hyperlipidemia 2001    PVD (peripheral vascular disease) (HCC)     diminished pulse left foot    Systolic murmur          ROS:  Denies fever, chills, cough, chest pain, SOB,  nausea, vomiting, or diarrhea.   Denies wt loss, wt gain, hemoptysis, hematochezia or melena. Physical Examination:    /76 (BP 1 Location: Right arm, BP Patient Position: Sitting)  Pulse 72  Resp 16  Wt 170 lb (77.1 kg)  SpO2 98%  BMI 25.1 kg/m2    General: Alert and Ox3, Fluent speech  HEENT:  NC/AT, EOMI, OP: clear  Neck:  Supple, no adenopathy, JVD, mass or bruit  Chest:  Clear to Ausculation, without wheezes, rales, rubs or ronchi  Cardiac: regular rate and rhythm, systolic murmur: holosystolic 2/6, rumbling at apex   Abdomen:  +BS, soft, nontender without palpable HSM  Extremities:  No cyanosis, clubbing or edema  Neurologic:  Ambulatory without assist, CN 2-12 grossly intact. Moves all extremities. Skin: no rash  Lymphadenopathy: no cervical or supraclavicular nodes    Lab Results   Component Value Date/Time    Hemoglobin A1c 5.7 08/08/2017 10:59 AM     ASSESSMENT AND PLAN:     1. Doing well with T2DM. Assess again in October. Will need to adjust meds at that time. Stop Glimepiride if hypoglycemic sx emerge  2. Well controlled HTN    No orders of the defined types were placed in this encounter.       Johnathan Coffey MD, [place]

## 2017-10-17 NOTE — PROGRESS NOTES
Chief Complaint   Patient presents with    Diabetes         HPI:      Theo Lee is a 80 y.o. male. T2DM with ongoing weight loss and anorexia. Has fallen twice recently. Forgetful. Has tried Aricept and this did not help. Failed Valproic acid. Denies hypoglycemia. New Issues:  Requests flu vaccine. No Known Allergies    Current Outpatient Prescriptions   Medication Sig    venlafaxine (EFFEXOR) 37.5 mg tablet Take 1 Tab by mouth two (2) times a day.  tamsulosin (FLOMAX) 0.4 mg capsule Take 1 Cap by mouth daily.  dutasteride (AVODART) 0.5 mg capsule TAKE 1 CAPSULE DAILY    glucose blood VI test strips (ASCENSIA AUTODISC VI, ONE TOUCH ULTRA TEST VI) strip 100    aspirin delayed-release 81 mg tablet Take  by mouth daily. No current facility-administered medications for this visit. Past Medical History:   Diagnosis Date    BPH (benign prostatic hypertrophy) 1994    CAD (coronary artery disease) 2001    Dementia     [facility]    HTN (hypertension)     Hyperlipidemia 2001    PVD (peripheral vascular disease) (HCC)     diminished pulse left foot    Systolic murmur          ROS:  Denies fever, chills, cough, chest pain, SOB,  nausea, vomiting, or diarrhea. Denies wt loss, wt gain, hemoptysis, hematochezia or melena. Physical Examination:    BP 90/56 (BP 1 Location: Left arm, BP Patient Position: Sitting)  Pulse 90  Resp 16  Wt 151 lb (68.5 kg)  SpO2 98%  BMI 22.3 kg/m2    General: Alert and Ox2, Fluent speech  HEENT:  NC/AT, EOMI, OP: clear  Neck:  Supple, no adenopathy, JVD, mass or bruit  Chest:  Clear to Ausculation, without wheezes, rales, rubs or ronchi  Cardiac: RRR  Abdomen:  +BS, soft, nontender without palpable HSM  Extremities:  No cyanosis, clubbing or edema  Neurologic:  Ambulatory without assist, CN 2-12 grossly intact. Moves all extremities.   Skin: no rash  Lymphadenopathy: no cervical or supraclavicular nodes      ASSESSMENT AND PLAN:     1. Anger:  May be hypoglycemia. Adding Venlafaxine. Consider night time Seroquel and Exelon  2. HTN:  BP is low with falls. D/c these meds until next visit and reeval  3. Flu vaccine  4. T2DM:  Labs in 4 weeks in [place]. Orders Placed This Encounter    INFLUENZA VIRUS VACCINE, HIGH DOSE SEASONAL, PRESERVATIVE FREE    LIPID PANEL     Standing Status:   Future     Standing Expiration Date:   12/25/2017    CBC WITH AUTOMATED DIFF     Standing Status:   Future     Standing Expiration Date:   90/30/8312    METABOLIC PANEL, COMPREHENSIVE     Standing Status:   Future     Standing Expiration Date:   12/25/2017    HEMOGLOBIN A1C WITH EAG     Standing Status:   Future     Standing Expiration Date:   12/25/2017    ADMIN INFLUENZA VIRUS VAC    DISCONTD: venlafaxine (EFFEXOR) 37.5 mg tablet     Sig: Take 1 Tab by mouth two (2) times a day. Dispense:  60 Tab     Refill:  6    venlafaxine (EFFEXOR) 37.5 mg tablet     Sig: Take 1 Tab by mouth two (2) times a day.      Dispense:  60 Tab     Refill:  6       Shantell Hopkins MD, [address]

## 2017-10-17 NOTE — MR AVS SNAPSHOT
Visit Information Date & Time Provider Department Dept. Phone Encounter #  
 10/17/2017  3:00 PM Cali Cedeño MD [address] [place] [address] [id number] Your Appointments 11/16/2017 11:45 AM  
ESTABLISHED PATIENT with Cali Cedeño MD  
[address] ([address]) Appt Note: 4 wk fu per Dr. Meghann Castelan [address] [address]  
3021 [facility] [address] [address] 2/13/2018 11:00 AM  
ESTABLISHED PATIENT with Cali Cedeño MD  
[facility] ([address]) Appt Note: 4 mo f/u  
 1000 [facility] [address] [facility],5[address] [address] [phone]  
  
   
 1000 [facility] [address] [facility],[address] [address] Upcoming Health Maintenance Date Due MICROALBUMIN Q1 9/15/1945 EYE EXAM RETINAL OR DILATED Q1 9/15/1945 GLAUCOMA SCREENING Q2Y 9/15/2000 INFLUENZA AGE 9 TO ADULT 10/30/2017* HEMOGLOBIN A1C Q6M 2/8/2018 FOOT EXAM Q1 2/10/2018 MEDICARE YEARLY EXAM 2/11/2018 LIPID PANEL Q1 8/8/2018 DTaP/Tdap/Td series (2 - Td) 8/11/2027 *Topic was postponed. The date shown is not the original due date. Allergies as of 10/17/2017  Review Complete On: 10/17/2017 By: Cali Cedeño MD  
 No Known Allergies Current Immunizations  Never Reviewed Name Date Influenza High Dose Vaccine PF 10/17/2017  3:48 PM  
 Influenza Vaccine 1/14/2017, 10/14/2015 10:02 AM, 10/9/2014 Pneumococcal Conjugate (PCV-13) 2/5/2016 11:03 AM  
 Pneumococcal Vaccine (Unspecified Type) 4/1/2013 Td 4/1/2013 Zoster Vaccine, Live 10/4/2012 Not reviewed this visit You Were Diagnosed With   
  
 Codes Comments Type 2 diabetes mellitus without complication, without long-term current use of insulin (HCC)    -  Primary ICD-10-CM: E11.9 ICD-9-CM: 250.00 Encounter for immunization     ICD-10-CM: D38 ICD-9-CM: V03.89   
 Other depression     ICD-10-CM: F32.89 ICD-9-CM: 615 Vitals BP Pulse Resp Weight(growth percentile) SpO2 BMI  
 90/56 (BP 1 Location: Left arm, BP Patient Position: Sitting) 90 16 151 lb (68.5 kg) 98% 22.3 kg/m2 Smoking Status Former Smoker BMI and BSA Data Body Mass Index Body Surface Area  
 22.3 kg/m 2 1.83 m 2 Preferred Pharmacy Pharmacy Name Phone [address], [address] [facility]  3 & [address]ACKIE [address] [place] [phone] Your Updated Medication List  
  
   
This list is accurate as of: 10/17/17  4:27 PM.  Always use your most recent med list.  
  
  
  
  
 aspirin delayed-release 81 mg tablet Take  by mouth daily. dutasteride 0.5 mg capsule Commonly known as:  AVODART TAKE 1 CAPSULE DAILY  
  
 glucose blood VI test strips strip Commonly known as:  ASCENSIA AUTODISC VI, ONE TOUCH ULTRA TEST VI  
100  
  
 tamsulosin 0.4 mg capsule Commonly known as:  FLOMAX Take 1 Cap by mouth daily. venlafaxine 37.5 mg tablet Commonly known as:  [facility] Take 1 Tab by mouth two (2) times a day. Prescriptions Sent to Pharmacy Refills  
 venlafaxine (EFFEXOR) 37.5 mg tablet 6 Sig: Take 1 Tab by mouth two (2) times a day. Class: Normal  
 Pharmacy: [facility] [place] [address], [address] [place] [address] [address] Ph #: [phone] Route: Oral  
  
We Performed the Following ADMIN INFLUENZA VIRUS VAC [ [facility]] INFLUENZA VIRUS VACCINE, HIGH DOSE SEASONAL, PRESERVATIVE FREE [40383 CPT(R)] Introducing [facility] & HEALTH SERVICES! Dear Hans Thompson: Thank you for requesting a [employer] account. Our records indicate that you already have an active [employer] account. You can access your account anytime at [email or web address] game platform. [email or web address] game platform Did you know that you can access your hospital and ER discharge instructions at any time in Wyoos? You can also review all of your test results from your hospital stay or ER visit. Additional Information If you have questions, please visit the Frequently Asked Questions section of the [employer] website at [email or web address]. [email or web address] Remember, [employer] is NOT to be used for urgent needs. For medical emergencies, dial 911. Now available from your iPhone and Android! Please provide this summary of care documentation to your next provider. Your primary care clinician is listed as Freddie Hoffmann. If you have any questions after today's visit, please call [phone].

## 2017-11-16 NOTE — MR AVS SNAPSHOT
Visit Information Date & Time Provider Department Dept. Phone Encounter #  
 11/16/2017 11:45 AM Alexander Juarez  [place] [phone] [id number] Your Appointments 2/13/2018 11:00 AM  
ESTABLISHED PATIENT with Alexander Juarez MD  
[place] [address] ([address]) Appt Note: 4 mo f/u  
 1000 [place] [address] [place] [facility],5[address] [address] [phone]  
  
   
 [address] [address] Upcoming Health Maintenance Date Due MICROALBUMIN Q1 9/15/1945 EYE EXAM RETINAL OR DILATED Q1 9/15/1945 GLAUCOMA SCREENING Q2Y 9/15/2000 FOOT EXAM Q1 2/10/2018 MEDICARE YEARLY EXAM 2/11/2018 HEMOGLOBIN A1C Q6M 5/13/2018 LIPID PANEL Q1 11/13/2018 DTaP/Tdap/Td series (2 - Td) 8/11/2027 Allergies as of 11/16/2017  Review Complete On: 11/16/2017 By: Tammy Carvalho RN No Known Allergies Current Immunizations  Never Reviewed Name Date Influenza High Dose Vaccine PF 10/17/2017  3:48 PM  
 Influenza Vaccine 1/14/2017, 10/14/2015 10:02 AM, 10/9/2014 Pneumococcal Conjugate (PCV-13) 2/5/2016 11:03 AM  
 Pneumococcal Vaccine (Unspecified Type) 4/1/2013 Td 4/1/2013 Zoster Vaccine, Live 10/4/2012 Not reviewed this visit You Were Diagnosed With   
  
 Codes Comments Hypercalcemia    -  Primary ICD-10-CM: W59.32 
ICD-9-CM: 275.42 Mixed hyperlipidemia     ICD-10-CM: E78.2 ICD-9-CM: 272.2 Vitals BP Pulse Temp Resp Height(growth percentile) 100/70 (BP 1 Location: Right arm, BP Patient Position: Sitting) 89 97.4 °F (36.3 °C) (Temporal) 16 5' 9\" (1.753 m) Weight(growth percentile) SpO2 BMI Smoking Status 150 lb 12.8 oz (68.4 kg) 99% 22.27 kg/m2 Former Smoker Vitals History BMI and BSA Data Body Mass Index Body Surface Area  
 22.27 kg/m 2 1.82 m 2 Preferred Pharmacy Pharmacy Name Phone [facility] [address] [phone] Your Updated Medication List  
  
   
This list is accurate as of: 11/16/17 12:43 PM.  Always use your most recent med list.  
  
  
  
  
 aspirin delayed-release 81 mg tablet Take  by mouth daily. dutasteride 0.5 mg capsule Commonly known as:  AVODART TAKE 1 CAPSULE DAILY  
  
 glucose blood VI test strips strip Commonly known as:  ASCENSIA AUTODISC VI, ONE TOUCH ULTRA TEST VI  
100  
  
 tamsulosin 0.4 mg capsule Commonly known as:  FLOMAX Take 1 Cap by mouth daily. venlafaxine 37.5 mg tablet Commonly known as:  [facility] Take 1 Tab by mouth two (2) times a day. Introducing [facility] & [facility] SERVICES! Dear Codey Walter: Thank you for requesting a k[employer] account. Our records indicate that you already have an active k[employer]RS account. You can access your account anytime at [email or web address] Systems. Ablative Solutions/Laszlo Systems Did you know that you can access your hospital and ER discharge instructions at any time in kWhOURS? You can also review all of your test results from your hospital stay or ER visit. Additional Information If you have questions, please visit the Frequently Asked Questions section of the [employer] website at [email or web address] Systems. [employer]/Laszlo Systems/. Remember, kWhOURS is NOT to be used for urgent needs. For medical emergencies, dial 911. Now available from your iPhone and Android! Please provide this summary of care documentation to your next provider. Your primary care clinician is listed as Ryanne Mondragon. If you have any questions after today's visit, please call [phone].

## 2017-11-16 NOTE — PROGRESS NOTES
No chief complaint on file. HPI:      Meche Reese is a 80 y.o. male. Previously diabetic, he is now losing wt at a rather quick pace. Wife notes that his memory is \"shot\" and is nearly total care. Forgets where the bathroom is in their home. She is evaluating placement options. Recent labs:  Elevated Calcium. Denies pain other than occasional LBP. No Known Allergies    Current Outpatient Prescriptions   Medication Sig    venlafaxine (EFFEXOR) 75 mg tablet Take 1 Tab by mouth two (2) times a day.  glucose blood VI test strips (ASCENSIA AUTODISC VI, ONE TOUCH ULTRA TEST VI) strip 100    tamsulosin (FLOMAX) 0.4 mg capsule Take 1 Cap by mouth daily.  dutasteride (AVODART) 0.5 mg capsule TAKE 1 CAPSULE DAILY    aspirin delayed-release 81 mg tablet Take  by mouth daily. No current facility-administered medications for this visit. Past Medical History:   Diagnosis Date    BPH (benign prostatic hypertrophy) 1994    CAD (coronary artery disease) 2001    Dementia     [facility] 1995    HTN (hypertension)     Hyperlipidemia 2001    PVD (peripheral vascular disease) (HCC)     diminished pulse left foot    Systolic murmur          ROS:  Denies fever, chills, cough, chest pain, SOB,  nausea, vomiting, or diarrhea. Denies wt loss, wt gain, hemoptysis, hematochezia or melena.     Physical Examination:    /70 (BP 1 Location: Right arm, BP Patient Position: Sitting)  Pulse 89  Temp 97.4 °F (36.3 °C) (Temporal)   Resp 16  Ht 5' 9\" (1.753 m)  Wt 150 lb 12.8 oz (68.4 kg)  SpO2 99%  BMI 22.27 kg/m2    General: Alert and Ox  name, Fluent speech  HEENT:  NC/AT, EOMI, OP: clear  Neck:  Supple, no adenopathy, JVD, mass or bruit  Chest:  Clear to Ausculation, without wheezes, rales, rubs or ronchi  Cardiac: RRR and rapid  Abdomen:  +BS, soft, nontender without palpable HSM  Extremities:  No cyanosis, clubbing or edema  Neurologic:  Ambulatory with walker, CN 2-12 grossly intact. Moves all extremities. Skin: no rash  Lymphadenopathy: no cervical or supraclavicular nodes      ASSESSMENT AND PLAN:     1. Weight loss:  Etiology is unclear. They are willing to proceed with a minimal workup for hypercalcemia and a CXR. 2.  Placement options discussed with spouse  3. RTC in 4 weeks  4. Increase Venlafaxine to 75 mg BID    Orders Placed This Encounter    XR CHEST PA LAT     Standing Status:   Future     Standing Expiration Date:   12/16/2018     Order Specific Question:   Reason for Exam     Answer:   weight loss     Order Specific Question:   Is Patient Allergic to Contrast Dye? Answer:   Unknown     Order Specific Question:   Which facility to perform procedure? Answer:   [facility]    SED RATE (ESR)    TSH 3RD GENERATION    METABOLIC PANEL, COMPREHENSIVE    GAMMOPATHY EVAL, SPEP/OLGA, IG QT/FLC    PTH INTACT    venlafaxine (EFFEXOR) 75 mg tablet     Sig: Take 1 Tab by mouth two (2) times a day.      Dispense:  60 Tab     Refill:  [address] Giuseppe Wen MD, [place]

## 2017-12-13 NOTE — PATIENT INSTRUCTIONS
If you have any questions regarding [employer], you may call [employer] support at [phone].

## 2017-12-13 NOTE — MR AVS SNAPSHOT
Visit Information Date & Time Provider Department Dept. Phone Encounter #  
 12/13/2017  3:30 PM Yany Duong [id number] Your Appointments 12/18/2017 10:30 AM  
ESTABLISHED PATIENT with Bigg Tesfaye MD  
[address] ([facility]) Appt Note: 1 mo f/u as per Dr Sullivan Galindo [address] [address]  
3021 [facility] [address] [address] 2/13/2018 11:00 AM  
ESTABLISHED PATIENT with Bigg Tesfaye MD  
[address] ([facility]) Appt Note: 4 mo f/u  
 [address] [place] [address] [phone]  
  
   
 [address] [place] [address] Upcoming Health Maintenance Date Due MICROALBUMIN Q1 9/15/1945 FOOT EXAM Q1 2/10/2018 MEDICARE YEARLY EXAM 2/11/2018 HEMOGLOBIN A1C Q6M 5/13/2018 EYE EXAM RETINAL OR DILATED Q1 9/11/2018 LIPID PANEL Q1 11/13/2018 GLAUCOMA SCREENING Q2Y 9/11/2019 DTaP/Tdap/Td series (2 - Td) 8/11/2027 Allergies as of 12/13/2017  Review Complete On: 11/16/2017 By: Bigg Tesfaye MD  
 No Known Allergies Current Immunizations  Never Reviewed Name Date Influenza High Dose Vaccine PF 10/17/2017  3:48 PM  
 Influenza Vaccine 1/14/2017, 10/14/2015 10:02 AM, 10/9/2014 Pneumococcal Conjugate (PCV-13) 2/5/2016 11:03 AM  
 Pneumococcal Vaccine (Unspecified Type) 4/1/2013 Td 4/1/2013 Zoster Vaccine, Live 10/4/2012 Not reviewed this visit Vitals Smoking Status Former Smoker Preferred Pharmacy Pharmacy Name Phone  [address] [facility] [phone] Your Updated Medication List  
  
   
This list is accurate as of: 12/13/17  3:51 PM.  Always use your most recent med list.  
  
  
  
  
 aspirin delayed-release 81 mg tablet Take  by mouth daily. dutasteride 0.5 mg capsule Commonly known as:  AVODART Take 1 Cap by mouth daily. glucose blood VI test strips strip Commonly known as:  ASCENSIA AUTODISC VI, ONE TOUCH ULTRA TEST VI  
100  
  
 tamsulosin 0.4 mg capsule Commonly known as:  FLOMAX Take 1 Cap by mouth daily. venlafaxine 75 mg tablet Commonly known as:  [facility] Take 1 Tab by mouth two (2) times a day. Patient Instructions If you have any questions regarding [employer], you may call [employer] support at [phone]. Introducing [facility] & [facility] SERVICES! Dear Zack Arceo: Thank you for requesting a [employer] account. Our records indicate that you already have an active [employer] account. You can access your account anytime at https://[email or web address]. CoolHotNot Corporation/[employer] Did you know that you can access your hospital and ER discharge instructions at any time in [employer]? You can also review all of your test results from your hospital stay or ER visit. Additional Information If you have questions, please visit the Frequently Asked Questions section of the [employer] website at https://[employer]. [employer] Corporation/[employer]/. Remember, [employer] is NOT to be used for urgent needs. For medical emergencies, dial 911. Now available from your iPhone and Android! Please provide this summary of care documentation to your next provider. Your primary care clinician is listed as Marco Vizcaino. If you have any questions after today's visit, please call [phone].

## 2017-12-13 NOTE — TELEPHONE ENCOUNTER
States  is urinating every hour, he doesn't complain of pain with voiding.  Unsure if he can even tell if he has pain, has a appointment next week but he has had freq urination for 4 days, wife will try to collect a urine sample

## 2017-12-13 NOTE — TELEPHONE ENCOUNTER
Mrs. Shelbie Gomez would like you to call [phone]  Re: Mr. Shelbie Gomez frequent urination/feet swollen

## 2017-12-13 NOTE — PROGRESS NOTES
Previous call from wife regarding hourly urination.  Concerned so brought in a urine sample, patient has appointment next week

## 2017-12-18 NOTE — MR AVS SNAPSHOT
Visit Information Date & Time Provider Department Dept. Phone Encounter #  
 12/18/2017 10:30 AM Michael Bennett MD [facility] [phone] [id number] Your Appointments 2/13/2018 11:00 AM  
ESTABLISHED PATIENT with Michael Bennett MD  
[place] ([address]) Appt Note: 4 mo f/u  
 [address] [address] [phone]  
  
   
 [address] [address] Upcoming Health Maintenance Date Due MICROALBUMIN Q1 12/18/2018* FOOT EXAM Q1 2/10/2018 MEDICARE YEARLY EXAM 2/11/2018 HEMOGLOBIN A1C Q6M 5/13/2018 EYE EXAM RETINAL OR DILATED Q1 9/11/2018 LIPID PANEL Q1 11/13/2018 GLAUCOMA SCREENING Q2Y 9/11/2019 DTaP/Tdap/Td series (2 - Td) 8/11/2027 *Topic was postponed. The date shown is not the original due date. Allergies as of 12/18/2017  Review Complete On: 12/18/2017 By: Michael Bennett MD  
 No Known Allergies Current Immunizations  Reviewed on 12/18/2017 Name Date Influenza High Dose Vaccine PF 10/17/2017  3:48 PM  
 Influenza Vaccine 1/14/2017, 10/14/2015 10:02 AM, 10/9/2014 Pneumococcal Conjugate (PCV-13) 2/5/2016 11:03 AM  
 Pneumococcal Vaccine (Unspecified Type) 4/1/2013 Td 4/1/2013 Zoster Vaccine, Live 10/4/2012 Reviewed by Eliazar Castle LPN on 25/99/0093 at 10:24 AM  
You Were Diagnosed With   
  
 Codes Comments Type 2 diabetes mellitus without complication, without long-term current use of insulin (HCC)    -  Primary ICD-10-CM: E11.9 ICD-9-CM: 250.00 Weight loss     ICD-10-CM: R63.4 ICD-9-CM: 783.21 Late onset Alzheimer's disease with behavioral disturbance     ICD-10-CM: G30.1, F02.81 ICD-9-CM: 331.0, 294.11 Vitals BP Pulse Temp Resp Height(growth percentile) Weight(growth percentile)  146/87 (BP 1 Location: Left arm, BP Patient Position: Sitting) 95 96.2 °F (35.7 °C) (Oral) 16 5' 9\" (1.753 m) 149 lb (67.6 kg) SpO2 BMI Smoking Status 98% 22 kg/m2 Former Smoker BMI and BSA Data Body Mass Index Body Surface Area  
 22 kg/m 2 1.81 m 2 Preferred Pharmacy Pharmacy Name Phone  [address] [phone] Your Updated Medication List  
  
   
This list is accurate as of: 12/18/17 11:33 AM.  Always use your most recent med list.  
  
  
  
  
 aspirin delayed-release 81 mg tablet Take  by mouth daily. dutasteride 0.5 mg capsule Commonly known as:  AVODART Take 1 Cap by mouth daily. glucose blood VI test strips strip Commonly known as:  ASCENSIA AUTODISC VI, ONE TOUCH ULTRA TEST VI  
100  
  
 tamsulosin 0.4 mg capsule Commonly known as:  FLOMAX Take 1 Cap by mouth daily. venlafaxine 75 mg tablet Commonly known as:  [facility] Take 1 Tab by mouth two (2) times a day. Introducing [facility] & HEALTH SERVICES! Dear Yuliana Beckford: Thank you for requesting a [employer] account. Our records indicate that you already have an active [employer] account. You can access your account anytime at [email or web address]. [employer]/RelayFoods Did you know that you can access your hospital and ER discharge instructions at any time in [employer]? You can also review all of your test results from your hospital stay or ER visit. Additional Information If you have questions, please visit the Frequently Asked Questions section of the [employer] website at [email or web address]. [employer]/[email or web address]/. Remember, [employer] is NOT to be used for urgent needs. For medical emergencies, dial 911. Now available from your iPhone and Android! Please provide this summary of care documentation to your next provider. Your primary care clinician is listed as Hitesh Zavala. If you have any questions after today's visit, please call [phone].

## 2017-12-18 NOTE — PROGRESS NOTES
Chief Complaint   Patient presents with    Follow-up    Diabetes         HPI:      Bharathi Esteves is a 80 y.o. male. Progressive dementia over the past 3 years. Occasional difficulty with behavior. Appetite has picked up. Off all T2DM meds. Last A1c 5.3. Frequent urination, but urine culture (-). Some ankle edema, worse later in the day. Wife is working with a geriatric consultant. No Known Allergies    Current Outpatient Prescriptions   Medication Sig    dutasteride (AVODART) 0.5 mg capsule Take 1 Cap by mouth daily.  venlafaxine (EFFEXOR) 75 mg tablet Take 1 Tab by mouth two (2) times a day.  glucose blood VI test strips (ASCENSIA AUTODISC VI, ONE TOUCH ULTRA TEST VI) strip 100    tamsulosin (FLOMAX) 0.4 mg capsule Take 1 Cap by mouth daily.  aspirin delayed-release 81 mg tablet Take  by mouth daily. No current facility-administered medications for this visit. Past Medical History:   Diagnosis Date    BPH (benign prostatic hypertrophy) 1994    CAD (coronary artery disease) 2001    Dementia     [facility]    HTN (hypertension)     Hyperlipidemia 2001    PVD (peripheral vascular disease) (HCC)     diminished pulse left foot    Systolic murmur          ROS:  Denies fever, chills, cough, chest pain, SOB,  nausea, vomiting, or diarrhea. Denies wt loss, wt gain, hemoptysis, hematochezia or melena.     Physical Examination:    /87 (BP 1 Location: Left arm, BP Patient Position: Sitting)  Pulse 95  Temp 96.2 °F (35.7 °C) (Oral)   Resp 16  Ht 5' 9\" (1.753 m)  Wt 149 lb (67.6 kg)  SpO2 98%  BMI 22 kg/m2    General: Alert and Ox2, Fluent speech  HEENT:  NC/AT, EOMI, OP: clear  Neck:  Supple, no adenopathy, JVD, mass or bruit  Chest:  Clear to Ausculation, without wheezes, rales, rubs or ronchi  Cardiac: RRR  Abdomen:  +BS, soft, nontender without palpable HSM  Extremities:  No cyanosis, clubbing or edema  Neurologic:  Ambulatory without assist, CN 2-12 grossly intact. Moves all extremities. Skin:       Lymphadenopathy: no cervical or supraclavicular nodes      ASSESSMENT AND PLAN:     1. Weight loss appears to be stabilizing. In the absence of further workup this may be due to his worsening cognitive function. 2.  Well controlled HTN  3. T2DM:  Essentially in remission  4. Small ulcer left foot: Will address with caretaker  5. Edema:  Chronic venous stasis changes:  Compression and decreased salt intake. 6.  RTC in 2-3 months. No orders of the defined types were placed in this encounter.       Octavio Sampson MD, [facility]

## 2018-01-01 ENCOUNTER — OFFICE VISIT (OUTPATIENT)
Dept: FAMILY MEDICINE CLINIC | Age: 83
End: 2018-01-01

## 2018-01-01 ENCOUNTER — TELEPHONE (OUTPATIENT)
Dept: FAMILY MEDICINE CLINIC | Age: 83
End: 2018-01-01

## 2018-01-01 VITALS
SYSTOLIC BLOOD PRESSURE: 115 MMHG | TEMPERATURE: 97.9 F | OXYGEN SATURATION: 91 % | HEART RATE: 79 BPM | DIASTOLIC BLOOD PRESSURE: 78 MMHG | RESPIRATION RATE: 17 BRPM

## 2018-01-01 VITALS
WEIGHT: 132 LBS | BODY MASS INDEX: 19.55 KG/M2 | DIASTOLIC BLOOD PRESSURE: 80 MMHG | HEART RATE: 100 BPM | RESPIRATION RATE: 14 BRPM | TEMPERATURE: 97.4 F | HEIGHT: 69 IN | OXYGEN SATURATION: 94 % | SYSTOLIC BLOOD PRESSURE: 130 MMHG

## 2018-01-01 DIAGNOSIS — L89.152 DECUBITUS ULCER OF SACRAL REGION, STAGE 2 (HCC): ICD-10-CM

## 2018-01-01 DIAGNOSIS — F02.818 LATE ONSET ALZHEIMER'S DISEASE WITH BEHAVIORAL DISTURBANCE (HCC): ICD-10-CM

## 2018-01-01 DIAGNOSIS — L97.921 SKIN ULCER OF LEFT LOWER LEG, LIMITED TO BREAKDOWN OF SKIN (HCC): Primary | ICD-10-CM

## 2018-01-01 DIAGNOSIS — R60.0 BILATERAL LOWER EXTREMITY EDEMA: ICD-10-CM

## 2018-01-01 DIAGNOSIS — L03.116 LEFT LEG CELLULITIS: ICD-10-CM

## 2018-01-01 DIAGNOSIS — E11.9 TYPE 2 DIABETES MELLITUS WITHOUT COMPLICATION, WITHOUT LONG-TERM CURRENT USE OF INSULIN (HCC): ICD-10-CM

## 2018-01-01 DIAGNOSIS — G30.1 LATE ONSET ALZHEIMER'S DISEASE WITH BEHAVIORAL DISTURBANCE (HCC): ICD-10-CM

## 2018-01-01 DIAGNOSIS — Z00.00 MEDICARE ANNUAL WELLNESS VISIT, SUBSEQUENT: Primary | ICD-10-CM

## 2018-01-01 RX ORDER — MENTHOL AND ZINC OXIDE .44; 20.625 G/100G; G/100G
1 OINTMENT TOPICAL AS NEEDED
Qty: 71 G | Refills: 5 | Status: SHIPPED | OUTPATIENT
Start: 2018-01-01

## 2018-01-01 RX ORDER — TRIAMCINOLONE ACETONIDE 1 MG/G
CREAM TOPICAL 2 TIMES DAILY
Qty: 453.6 G | Refills: 0 | Status: SHIPPED | OUTPATIENT
Start: 2018-01-01

## 2018-01-01 RX ORDER — CEPHALEXIN 500 MG/1
500 CAPSULE ORAL 4 TIMES DAILY
Qty: 40 CAP | Refills: 0 | Status: SHIPPED | OUTPATIENT
Start: 2018-01-01 | End: 2018-01-01

## 2018-01-01 RX ORDER — POTASSIUM CHLORIDE 750 MG/1
10 TABLET, EXTENDED RELEASE ORAL DAILY
Qty: 5 TAB | Refills: 0 | Status: SHIPPED | OUTPATIENT
Start: 2018-01-01 | End: 2018-01-01

## 2018-01-01 RX ORDER — FUROSEMIDE 20 MG/1
TABLET ORAL
Qty: 5 TAB | Refills: 0 | Status: SHIPPED | OUTPATIENT
Start: 2018-01-01

## 2018-01-01 NOTE — MR AVS SNAPSHOT
Visit Information Date & Time Provider Department Dept. Phone Encounter #  
 8/11/2017 11:00 AM [place] [address] [place] [id number] Your Appointments 10/17/2017  3:00 PM  
ESTABLISHED PATIENT with MD Clifton Carrizales 38 ([facility]) Appt Note: 2 mo f/u  
 [address] [address] [phone]  
  
   
 [address] [address] 2/13/2018 11:00 AM  
ESTABLISHED PATIENT with MD Clifton Carrizales 38 ([facility]) Appt Note: 4 mo f/u  
 [address] [address] [phone] Upcoming Health Maintenance Date Due MICROALBUMIN Q1 9/15/1945 EYE EXAM RETINAL OR DILATED Q1 9/15/1945 GLAUCOMA SCREENING Q2Y 9/15/2000 INFLUENZA AGE 9 TO ADULT 10/30/2017* HEMOGLOBIN A1C Q6M 2/8/2018 FOOT EXAM Q1 2/10/2018 MEDICARE YEARLY EXAM 2/11/2018 LIPID PANEL Q1 8/8/2018 DTaP/Tdap/Td series (2 - Td) 8/11/2027 *Topic was postponed. The date shown is not the original due date. Allergies as of 8/11/2017  Review Complete On: 8/11/2017 By: Russ Amador RN No Known Allergies Current Immunizations  Never Reviewed Name Date Influenza Vaccine 1/14/2017, 10/14/2015 10:02 AM, 10/9/2014 Pneumococcal Conjugate (PCV-13) 2/5/2016 11:03 AM  
 Pneumococcal Vaccine (Unspecified Type) 4/1/2013 Td 4/1/2013 Zoster Vaccine, Live 10/4/2012 Not reviewed this visit Vitals BP Pulse Resp Weight(growth percentile) SpO2 BMI  
 132/76 (BP 1 Location: Right arm, BP Patient Position: Sitting) 72 16 170 lb (77.1 kg) 98% 25.1 kg/m2 Smoking Status Former Smoker Vitals History BMI and BSA Data Body Mass Index Body Surface Area  
 25.1 kg/m 2 1.94 m 2 Preferred Pharmacy Pharmacy Name Phone Well Child Visit for Newborns   AMBULATORY CARE:   A well child visit  is when your child sees a healthcare provider to prevent health problems  Well child visits are used to track your child's growth and development  It is also a time for you to ask questions and to get information on how to keep your child safe  Write down your questions so you remember to ask them  Your child should have regular well child visits from birth to 16 years  Development milestones your  may reach:   · Respond to sound, faces, and bright objects that are near him or her    · Grasp a finger placed in his or her palm    · Have rooting and sucking reflexes, and turn his or her head toward a nipple    · React in a startled way by throwing his or her arms and legs out and then curling them in  What you can do when your baby cries: These actions may help calm your baby when he or she cries:  · Hold your baby skin to skin and rock him or her, or swaddle him or her in a soft blanket  · Gently pat your baby's back or chest  Stroke or rub his or her head  · Quietly sing or talk to your baby, or play soft, soothing music  · Put your baby in his or her car seat and take him or her for a drive, or go for a stroller ride  · Burp your baby to get rid of extra gas  · Give your baby a soothing, warm bath  What you need to know about feeding your : The following are general guidelines  Talk to your healthcare provider if you have any questions or concerns about feeding your :  · Feed your  only breast milk or formula for 4 to 6 months  Do not give your  anything other than breast milk  He or she does not need water or any other food at this age  · Your baby may let you know when he or she is ready to eat  He or she may be more awake and may move more  He or she may put his or her hands up to his or her mouth  He or she may make sucking noises   Crying is normally a late sign that your baby is [facility] [address] [phone] Your Updated Medication List  
  
   
This list is accurate as of: 8/11/17 11:29 AM.  Always use your most recent med list. amLODIPine 10 mg tablet Commonly known as:  Skip Newcomer Take 1 Tab by mouth daily. aspirin delayed-release 81 mg tablet Take  by mouth daily. bisoprolol-hydroCHLOROthiazide 5-6.25 mg per tablet Commonly known as:  [facility] Take 1 Tab by mouth daily. divalproex  mg tablet Commonly known as:  DEPAKOTE Take 1 Tab by mouth two (2) times a day. dutasteride 0.5 mg capsule Commonly known as:  AVODART TAKE 1 CAPSULE DAILY  
  
 glimepiride 1 mg tablet Commonly known as:  AMARYL Take 1 Tab by mouth Daily (before breakfast). glucose blood VI test strips strip Commonly known as:  ASCENSIA AUTODISC VI, ONE TOUCH ULTRA TEST VI  
100  
  
 metFORMIN 1,000 mg tablet Commonly known as:  GLUCOPHAGE  
TAKE 1 TABLET TWICE DAILY  WITH MEALS  
  
 omeprazole 20 mg capsule Commonly known as:  PRILOSEC Take 20 mg by mouth daily. quinapril 40 mg tablet Commonly known as:  ACCUPRIL Take 1 Tab by mouth daily. spironolactone 25 mg tablet Commonly known as:  ALDACTONE Take 1 Tab by mouth two (2) times a day. tamsulosin 0.4 mg capsule Commonly known as:  FLOMAX Take 1 Cap by mouth daily. Introducing [facility]! Dear Dhaval Collins: Thank you for requesting a VTM account. Our records indicate that you already have an active VTM account. You can access your account anytime at [email or web address] Did you know that you can access your hospital and ER discharge instructions at any time in VTM? You can also review all of your test results from your hospital stay or ER visit. Additional Information If you have questions, please visit the Frequently Asked Questions section hungry  · Feed your  8 to 12 times each day  He or she will probably want to drink every 2 to 4 hours  Wake your baby to feed him or her if he or she sleeps longer than 4 to 5 hours  If your  is sleeping and it is time to feed, lightly rub your finger across his or her lips  You can also undress him or her or change his or her diaper  At 3 to 4 days after birth, your  may eat every 1 to 2 hours  Your  will return to eating every 2 to 4 hours when he or she is 4 week old  · Your  will give you signs when he or she has had enough to drink  Stop feeding him or her when he or she shows signs that he or she is no longer hungry  He or she may turn his or her head away, seal his or her lips, spit out the nipple, or stop sucking  Your  may fall asleep near the end of a feeding  If this happens, do not wake him or her  What you need to know about breastfeeding your :   · Breast milk has many benefits for your   Your breasts will first produce colostrum  Colostrum is rich in antibodies (proteins that protect your baby's immune system)  Breast milk starts to replace colostrum 2 to 4 days after your baby's birth  Breast milk contains the protein, fat, sugar, vitamins, and minerals that your  needs to grow  Breast milk protects your  against allergies and infections  It may also decrease your 's risk for sudden infant death syndrome (SIDS)  · Find a comfortable way to hold your baby during breastfeeding  Ask your healthcare provider for more information on how to hold your baby during breastfeeding  · Your  should have 6 to 8 wet diapers every day  The number of wet diapers will let you know that your  is getting enough breast milk  Your  may have 3 to 4 bowel movements every day  Your 's bowel movements may be loose       · Do not give your baby a pacifier until he or she is 4 to 6 of the Cor[employer] website at [email or web address] Remember, Correlsense is NOT to be used for urgent needs. For medical emergencies, dial 911. Now available from your iPhone and Android! Please provide this summary of care documentation to your next provider. Your primary care clinician is listed as Rodger Lam. If you have any questions after today's visit, please call [phone]. weeks old  The use of a pacifier at this time may make breastfeeding difficult for your baby  · Get support and more information about breastfeeding your   [facility] [employer]  [address] [facility] Alfonzo Handy  Phone: [phone]  Web Address: http://[employer]/  [facility]  [address] [place] [address] Silvino Angeles  Phone: [phone]  Phone: [phone]  Web Address: [email or web address] [facility]/  [facility]  What you need to know about feeding your baby formula:   · Ask your healthcare provider which formula to feed your   Your  may need formula that contains iron  The different types of formulas include cow's milk, soy, and other formulas  Some formulas are ready to drink, and some need to be mixed with water  Ask your healthcare provider how to prepare your 's formula  · Hold your  upright during bottle-feeding  You may be comfortable feeding your  while sitting in a rocking chair or an armchair  Hold your baby so you can look at each other during feeding  This is a way for you to bond  Put a pillow under your arm for support  Gently wrap your arm around your 's upper body, supporting his or her head with your arm  Be sure your baby's upper body is higher than his or her lower body  Do not prop a bottle in your 's mouth or let him or her lie flat during feeding  This may cause him or her to choke  · Your  will drink about 2 to 4 ounces of formula at each feeding  Your  may want to drink a lot one day and not want to drink much the next  · Wash bottles and nipples with soap and hot water  Use a bottle brush to help clean the bottle and nipple  Rinse with warm water after cleaning  Let bottles and nipples air dry  Make sure they are completely dry before you store them in cabinets or drawers    How to burp your :  Burp your  when you switch breasts or after every 2 to 3 ounces from a bottle  Burp him or her again when he or she is finished eating  Your  may spit up when he or she burps  This is normal  Hold your baby in any of the following positions to help him or her burp:  · Hold your  against your chest or shoulder  Support his or her bottom with one hand  Use your other hand to pat or rub his or her back gently  · Sit your  upright on your lap  Use one hand to support his or her chest and head  Use the other hand to pat or rub his or her back  · Place your  across your lap  He or she should face down with his or her head, chest, and belly resting on your lap  Hold him or her securely with one hand and use your other hand to rub or pat his or her back  How to lay your  down to sleep: It is very important to lay your  down to sleep in safe surroundings  This can greatly reduce his or her risk for SIDS  Tell grandparents, babysitters, and anyone else who cares for your  the following rules:  · Put your  on his or her back to sleep  Do this every time he or she sleeps (naps and at night)  Do this even if your baby sleeps more soundly on his or her stomach or side  Your  is less likely to choke on spit-up or vomit if he or she sleeps on his or her back  · Put your  on a firm, flat surface to sleep  Your  should sleep in a crib, bassinet, or cradle that meets the safety standards of the Consumer Product Safety Commission (CPSC)  Do not let him or her sleep on pillows, waterbeds, soft mattresses, quilts, beanbags, or other soft surfaces  Move your baby to his or her bed if he or she falls asleep in a car seat, stroller, or swing  He or she may change positions in a sitting device and not be able to breathe well  · Put your  to sleep in a crib or bassinet that has firm sides  The rails around your 's crib should not be more than 2? inches apart  A mesh crib should have small openings less than ¼ of an inch  · Put your  in his or her own bed  A crib or bassinet in your room, near your bed, is the safest place for your baby to sleep  Never let him or her sleep in bed with you  Never let him or her sleep on a couch or recliner  · Do not leave soft objects or loose bedding in his or her crib  His or her bed should contain only a mattress covered with a fitted bottom sheet  Use a sheet that is made for the mattress  Do not put pillows, bumpers, comforters, or stuffed animals in his or her bed  Dress your  in a sleep sack or other sleep clothing before you put him or her down to sleep  Do not use loose blankets  If you must use a blanket, tuck it around the mattress  · Do not let your  get too hot  Keep the room at a temperature that is comfortable for an adult  Never dress him or her in more than 1 layer more than you would wear  Do not cover your baby's face or head while he or she sleeps  Your  is too hot if he or she is sweating or his or her chest feels hot  · Do not raise the head of your 's bed  Your  could slide or roll into a position that makes it hard for him or her to breathe  Keep your  safe:   · Do not give your baby medicine unless directed by his or her healthcare provider  Ask for directions if you do not know how to give the medicine  If your baby misses a dose, do not double the next dose  Ask how to make up the missed dose  Do not give aspirin to children under 25years of age  Your child could develop Reye syndrome if he takes aspirin  Reye syndrome can cause life-threatening brain and liver damage  Check your child's medicine labels for aspirin, salicylates, or oil of wintergreen  · Never shake your  to stop his or her crying  This can cause blindness or brain damage   It can be hard to listen to your  cry and not be able to calm him or her down  Place your  in his or her crib or playpen if you feel frustrated or upset  Call a friend or family member and tell them how you feel  Ask for help and take a break if you feel stressed or overwhelmed  · Never leave your  in a playpen or crib with the drop-side down  Your  could fall and be injured  Make sure that the drop-side is locked in place  · Always keep one hand on your  when you change his or her diapers or dress him or her  This will prevent him or her from falling from a changing table, counter, bed, or couch  · Always put your  in a rear-facing car seat  The car seat should always be in the back seat  Make sure you have a safety seat that meets the federal safety standards  It is very important to install the safety seat properly in your car and to always use it correctly  The harness and straps should be positioned to prevent your baby's head from falling forward  Ask for more information about  safety seats  · Do not smoke near your   Do not let anyone else smoke near your   Do not smoke in your home or vehicle  Smoke from cigarettes or cigars can cause asthma or breathing problems in your   · Take an infant CPR and first aid class  These classes will help teach you how to care for your baby in an emergency  Ask your baby's healthcare provider where you can take these classes  How to care for your 's skin:   · Sponge bathe your  with warm water and a cleanser made for a baby's skin  Do not use baby oil, creams, or ointments  These may irritate your baby's skin or make skin problems worse  Wash your baby's head and scalp every day  This may prevent cradle cap  Do not bathe your baby in a tub or sink until his or her umbilical cord has fallen off  Ask for more information on sponge bathing your baby  · Use moisturizing lotions on your 's dry skin    Ask your healthcare provider which lotions are safe to use on your 's skin  Do not use powders  · Prevent diaper rash  Change your 's diaper frequently  Clean your 's bottom with a wet washcloth or diaper wipe  Do not use diaper wipes if your baby has a rash or circumcision that has not yet healed  Gently lift both legs and wash his or her buttocks  Always wipe from front to back  Clean under all skin folds and between creases  Let his or her skin air dry before you replace his or her diaper  Ask your 's healthcare provider about creams and ointments that are safe to use on his or her diaper area  · Use a wet washcloth or cotton ball to clean the outer part of your 's ears  Do not put cotton swabs into your 's ears  These can hurt his or her ears and push earwax in  Earwax should come out of your 's ear on its own  Talk to your baby's healthcare provider if you think your baby has too much earwax  · Keep your 's umbilical cord stump clean and dry  Your baby's umbilical cord stump will dry and fall off in about 7 to 21 days, leaving a bellybutton  If your baby's stump gets dirty from urine or bowel movement, wash it off right away with water  Gently pat the stump dry  This will help prevent infection around your baby's cord stump  Fold the front of the diaper down below the cord stump to let it air dry  Do not cover or pull at the cord stump  Call your 's healthcare provider if the stump is red, draining fluid, or has a foul odor  · Keep your  boy's circumcised area clean  Your baby's penis may have a plastic ring that will come off within 8 days  His penis may be covered with gauze and petroleum jelly  Gently blot or squeeze warm water from a wet cloth or cotton ball onto the penis  Do not use soap or diaper wipes to clean the circumcision area  This could sting or irritate your baby's penis  Your baby's penis should heal in 7 to 10 days      · Keep your  out of the sun  Your 's skin is sensitive  He or she may be easily burned  Cover your 's skin with clothing if you need to take him or her outside  Keep him or her in the shade as much as possible  Only apply sunscreen to your baby if there is no shade  Ask your healthcare provider what sunscreen is safe to put on your baby  How to clean your 's eyes and nose:   · Use a rubber bulb syringe to suction your 's nose if he or she is stuffed up  Point the bulb syringe away from his or her face and squeeze the bulb to create a vacuum  Gently put the tip into one of your 's nostrils  Close the other nostril with your fingers  Release the bulb so that it sucks out the mucus  Repeat if necessary  Boil the syringe for 10 minutes after each use  Do not put your fingers or cotton swabs into your 's nose  · Massage your 's tear ducts as directed  A blocked tear duct is common in newborns  A sign of a blocked tear duct is a yellow sticky discharge in one or both of your 's eyes  Your 's healthcare provider may show you how to massage your 's tear ducts to unplug them  Do not massage your 's tear ducts unless his or her healthcare provider says it is okay  Prevent your  from getting sick:   · Wash your hands before you touch your   Use an alcohol-based hand  or soap and water  Wash your hands after you change your 's diaper and before you feed him or her  · Ask all visitors to wash their hands before they touch your   Have them use an alcohol-based hand  or soap and water  Tell friends and family not to visit your  if they are sick  · Keep your  away from crowded places  Do not bring your  to crowded places such as the mall, restaurant, or movie theater  Your 's immune system is not strong and he or she can easily get sick    What you can do to care for yourself and your family:   · Sleep when your baby sleeps  Your baby may feed often during the night  Get rest during the day while your baby sleeps  · Ask for help from family and friends  Caring for a  can be overwhelming  Talk to your family and friends  Tell them what you need them to do to help you care for your baby  · Take time for yourself and your partner  Plan for time alone with your partner  Find ways to relax such as watching a movie, listening to music, or going for a walk together  You and your partner need to be healthy so you can care for your baby  · Let your other children help with the care of your   This will help your other children feel loved and cared about  Let them help you feed the baby or bathe him or her  Never leave the baby alone with other children  · Spend time alone with your other children  Do activities with them that they enjoy  Ask them how they feel about the new baby  Answer any questions or concerns that they have about the new baby  Try to continue family routines  · Join a support group  It may be helpful to talk with other new moms  What you need to know about your 's next well child visit:  Your 's healthcare provider will tell you when to bring him or her in again  The next well child visit is usually at 1 or 2 weeks  Contact your 's healthcare provider if you have any questions or concerns about your baby's health or care before the next visit  ©  2600 David Lamb Information is for End User's use only and may not be sold, redistributed or otherwise used for commercial purposes  All illustrations and images included in CareNotes® are the copyrighted property of [employer] , [employer]  or Cayden Eddy  The above information is an  only  It is not intended as medical advice for individual conditions or treatments   Talk to your doctor, nurse or pharmacist before following any medical regimen to see if it is safe and effective for you

## 2018-02-14 NOTE — TELEPHONE ENCOUNTER
----- Message from Dewayne Lucio sent at 2/14/2018  9:22 AM EST -----  Regarding: Dr. Dacia Spring  Pt request a call back from the doctors nurse in regards to wound on leg. Best contact number is [phone].

## 2018-02-14 NOTE — TELEPHONE ENCOUNTER
I spoke to patients wife. He is no longer on diabetic medication and his sugars having been running high 249-325. He also has an open wound on his leg the size of a dime and now has a 2nd area where the skin has split. Appointment set tomorrow with Pradip Lenz.

## 2018-02-15 NOTE — MR AVS SNAPSHOT
[facility] 
 
 
 [address] [address] [phone] Patient: Arturo Oar MRN: [id number] Visit Information Date & Time Provider Department Dept. Phone Encounter #  
 2/15/2018  1:00 PM Phyllis Son NP [address] [place] [address] [id number] Your Appointments 2/20/2018 11:00 AM  
ESTABLISHED PATIENT with MD Gloria Pierre 72 [facility] Appt Note: 2 mo F/U  
 [address] [place] [address]  
[address] [facility] [place] [address] Upcoming Health Maintenance Date Due  
 FOOT EXAM Q1 2/10/2018 MEDICARE YEARLY EXAM 2/11/2018 MICROALBUMIN Q1 12/18/2018* HEMOGLOBIN A1C Q6M 5/13/2018 EYE EXAM RETINAL OR DILATED Q1 9/11/2018 LIPID PANEL Q1 11/13/2018 GLAUCOMA SCREENING Q2Y 9/11/2019 DTaP/Tdap/Td series (2 - Td) 8/11/2027 *Topic was postponed. The date shown is not the original due date. Allergies as of 2/15/2018  Review Complete On: 2/15/2018 By: Phyllis Son NP No Known Allergies Current Immunizations  Reviewed on 12/18/2017 Name Date Influenza High Dose Vaccine PF 10/17/2017  3:48 PM  
 Influenza Vaccine 1/14/2017, 10/14/2015 10:02 AM, 10/9/2014 Pneumococcal Conjugate (PCV-13) 2/5/2016 11:03 AM  
 Pneumococcal Vaccine (Unspecified Type) 4/1/2013 Td 4/1/2013 Zoster Vaccine, Live 10/4/2012 Not reviewed this visit You Were Diagnosed With   
  
 Codes Comments Skin ulcer of left lower leg, limited to breakdown of skin ([facility]    -  Primary ICD-10-CM: S08.667 ICD-9-CM: 707.10 Bilateral lower extremity edema     ICD-10-CM: R60.0 ICD-9-CM: 782.3 Decubitus ulcer of sacral region, stage 2     ICD-10-CM: L89.152 ICD-9-CM: 707.03, 707.22 Vitals BP Pulse Temp Resp SpO2 Smoking Status 115/78 (BP 1 Location: Right arm, BP Patient Position: Sitting) 79 97.9 °F (36.6 °C) (Oral) 17 91% Former Smoker Preferred Pharmacy Pharmacy Name Phone  [address] [phone] Your Updated Medication List  
  
   
This list is accurate as of: 2/15/18  1:38 PM.  Always use your most recent med list.  
  
  
  
  
 aspirin delayed-release 81 mg tablet Take  by mouth daily. dutasteride 0.5 mg capsule Commonly known as:  AVODART Take 1 Cap by mouth daily. glucose blood VI test strips strip Commonly known as:  ASCENSIA AUTODISC VI, ONE TOUCH ULTRA TEST VI  
100  
  
 tamsulosin 0.4 mg capsule Commonly known as:  FLOMAX Take 1 Cap by mouth daily. venlafaxine 75 mg tablet Commonly known as:  [facility] Take 1 Tab by mouth two (2) times a day. Introducing [facility] & HEALTH SERVICES! Dear Sina Ok: Thank you for requesting a [employer] (formerly [employer]) account. Our records indicate that you already have an active [employer] ([employer]) account. You can access your account anytime at [email or web address] Did you know that you can access your hospital and ER discharge instructions at any time in [employer] ([employer])? You can also review all of your test results from your hospital stay or ER visit. Additional Information If you have questions, please visit the Frequently Asked Questions section of the [employer] ([employer]) website at [email or web address] Remember, [employer] (formerly [employer]) is NOT to be used for urgent needs. For medical emergencies, dial 911. Now available from your iPhone and Android! Please provide this summary of care documentation to your next provider. Your primary care clinician is listed as Ismael Seaman. If you have any questions after today's visit, please call [phone].

## 2018-02-15 NOTE — PROGRESS NOTES
Chief Complaint   Patient presents with    Wound Check     left leg and buttocks wound. HPI:      Ralph Tafoya is a 80 y.o. male. Progressive dementia over the past 3 years. Occasional difficulty with behavior. Wife is the primary caregiver. Off all T2DM meds. Last A1c 5.3. New Issues:  He has a buttocks wound that his wife has been putting Maalox on and a left leg wound that they have been putting peroxide and bacitracin on the wound. His left are swollen and the left leg seems to be oozing. He is not good about keeping his feet up. Sits in the recliner a majority of the time and often sleeps in it. No Known Allergies    Current Outpatient Prescriptions   Medication Sig    dutasteride (AVODART) 0.5 mg capsule Take 1 Cap by mouth daily.  venlafaxine (EFFEXOR) 75 mg tablet Take 1 Tab by mouth two (2) times a day.  glucose blood VI test strips (ASCENSIA AUTODISC VI, ONE TOUCH ULTRA TEST VI) strip 100    tamsulosin (FLOMAX) 0.4 mg capsule Take 1 Cap by mouth daily.  aspirin delayed-release 81 mg tablet Take  by mouth daily. No current facility-administered medications for this visit.         Past Medical History:   Diagnosis Date    BPH (benign prostatic hypertrophy) [address] [place] [address]    CAD (coronary artery disease) 2001    Dementia     [facility] 1995    HTN (hypertension)     Hyperlipidemia 2001    PVD (peripheral vascular disease) ([facility])     diminished pulse left foot    Systolic murmur        Past Surgical History:   Procedure Laterality Date    HX CARPAL TUNNEL RELEASE Right     HX CATARACT REMOVAL Bilateral     HX LUMBAR LAMINECTOMY      discectomy and lumbar fusion    HX PREMALIG/BENIGN SKIN LESION EXCISION  10/2013    Basal cell right chest    HX TONSILLECTOMY  age 11       Social History     Social History    Marital status:      Spouse name: N/A    Number of children: 2    Years of education: N/A     Occupational History    Physicist Retired     Social History Main Topics    Smoking status: Former Smoker    Smokeless tobacco: Never Used    Alcohol use No    Drug use: None    Sexual activity: Not Asked     Other Topics Concern     Service Yes     [employer]    Blood Transfusions No    Caffeine Concern No    Occupational Exposure No    Hobby Hazards No    Sleep Concern No    Stress Concern No    Weight Concern No    Special Diet No    Back Care No    Exercise No    Bike Helmet No    Seat Belt Yes    Self-Exams No     Social History Narrative       Family History   Problem Relation Age of Onset    Stroke Father     Cancer Mother        Above history reviewed. ROS:  Denies fever, chills, cough, chest pain, SOB,  nausea, vomiting, or diarrhea. Denies wt loss, wt gain, hemoptysis, hematochezia or melena. Physical Examination:    /78 (BP 1 Location: Right arm, BP Patient Position: Sitting)  Pulse 79  Temp 97.9 °F (36.6 °C) (Oral)   Resp 17  SpO2 91%    General: Alert and Ox3, Fluent speech  Neck:  Supple, no adenopathy, JVD, mass or bruit  Chest:  Clear to Ausculation upper lobes, fine crackles in bases  Cardiac: RRR  Extremities:  No cyanosis or clubbing. Right LE with 3+ edema, left LE with 4+  Neurologic:  Ambulatory without assist, CN 2-12 grossly intact. Moves all extremities. Skin: Stage 2 right sided sacrum, left lower leg with oozing venous stasis ulcers  Lymphadenopathy: no cervical or supraclavicular nodes    [address]  Face to Face Encounter  Lynda Soliz is a 80 y.o. male presenting for/with:    Primary Diagnosis: Severe dementia, lower extremity edema, sacral wound  Date of Face to Face:  2/15/18                          Face to Face Encounter findings are related to primary reason for home care:   yes.      1. I certify that the patient needs intermittent care as follows: skilled nursing care:  teaching/training of turns and wound care, complex care plan management and wound care    2. I certify that this patient is homebound, that is:   1) patient requires the use of assistance of another to leave the home;   2) patient has a normal inability to leave the home and leaving the home requires considerable and taxing effort. Patient may leave the home for infrequent and short duration for medical reasons, and occasional absences for non-medical reasons. Homebound status is due to the following functional limitations: Patient with poor safety awareness and is at risk for falls without assistance of another person and the use of an assistive device. Patient with poor ambulation endurance limiting their safe ability to ascend/descend the required number of steps to leave the home. 3. I certify that this patient is under my care and that I, or a nurse practitioner or  [id number], or clinical nurse specialist, or certified nurse midwife, working with me, had a Face-to-Face Encounter that meets the physician Face-to-Face Encounter requirements. The following are the clinical findings from the [address] [place] [address] encounter that support the need for skilled services and is a summary of the encounter: See attached progess note    Divya Nunn NP   2/15/2018        ASSESSMENT AND PLAN:     1. Skin ulcer of left lower leg, limited to breakdown of skin [facility])  Encourage patient to elevate legs  Start wraps  May be a candidate for [facility]  - furosemide (LASIX) 20 mg tablet; Take 1 tablet in the morning PO with potassium for 5 days  Indications: Edema  Dispense: 5 Tab; Refill: 0  - potassium chloride (KLOR-CON) 10 mEq tablet; Take 1 Tab by mouth daily for 5 days. With Furosemide  Indications: hypokalemia prevention  Dispense: 5 Tab; Refill: 0  - REFERRAL TO HOME HEALTH    2. Bilateral lower extremity edema  Lasix and potassium x 5 days  - furosemide (LASIX) 20 mg tablet;  Take 1 tablet in the morning PO with potassium for 5 days  Indications: Edema  Dispense: 5 Tab; Refill: 0  - potassium chloride (KLOR-CON) 10 mEq tablet; Take 1 Tab by mouth daily for 5 days. With Furosemide  Indications: hypokalemia prevention  Dispense: 5 Tab; Refill: 0  - REFERRAL TO HOME HEALTH    3. Decubitus ulcer of sacral region, stage 2  Encourage turns  Start barrier cream and mepilex  - foam bandage (MEPILEX BORDER SACRUM) 9.2 X 9.2 \" bndg; 1 Each by Apply Externally route every seventy-two (72) hours. Indications: Sacral wound  Dispense: 5 Each; Refill: 5  - Menthol-Zinc Oxide (CALMOSEPTINE) 0.44-20.6 % oint; Apply 1 Each to affected area as needed.  Indications: Moisture incontinence injury  Dispense: 71 g; Refill: 5  - [facility] for appointment with Dr. John Marino on Tuesday    Rosa Wyatt NP

## 2018-02-15 NOTE — PATIENT INSTRUCTIONS
Learning About Preventing Pressure Sores  What are pressure sores? A pressure sore is an injury to the skin caused by constant pressure over a period of time. The constant pressure blocks the blood supply to the skin. This causes skin cells to die and creates a sore. Pressure sores are also called bedsores. Pressure sores usually occur over bony areas, such as the hips, lower back, elbows, heels, and shoulders. Pressure sores can also occur in places where the skin folds over on itself, or where medical equipment presses on the skin, such as when oxygen tubes press on the ears or cheeks. Pressure sores can range from red areas on the surface of the skin to severe tissue damage that goes deep into muscle and bone. Severe sores are hard to treat and slow to heal. When pressure sores do not heal properly, problems such as bone, blood, and skin infections can develop. What causes pressure sores? Things that cause pressure sores include:  · Pressure on the skin and tissues. For example, the sores may happen when a person lies in bed or sits in a wheelchair for a long time. This is the most common cause of pressure sores. · Sliding down in a bed or chair, forcing the skin to fold over itself (shear force). · Being pulled across bed sheets or other surfaces (friction burns). As we get older, our skin gets more thin and dry and less elastic, so it is easier to damage. Poor nutrition and not getting enough fluids make these natural changes in the skin worse. Skin in this condition may easily develop a pressure sore. Skin can also be damaged by sweat, feces, or urine, making pressure sores more likely and harder to heal.  How can you help prevent pressure sores? If you are not able to do these things yourself, ask a family member or friend for help. Change position often  · In a bed, change position every 2 hours.   · In a wheelchair or other type of chair, shift your weight every 15 minutes, and give yourself a full relief of weight every hour. ¨ For a weight shift, lean forward and to the left and right. Push up out of the chair with your arms. If you have a chair that tilts, use the tilt control to help relieve pressure. ¨ For a full relief of weight, stand up or move to another chair or bed if you are able to. Personal care  · Check your skin every day, especially around bony areas. When a pressure sore is forming, skin temperature can be different than nearby skin. It might be warmer or cooler. The skin can feel either firmer or softer than the surrounding skin. · Keep your skin clean and free of sweat, wound drainage, urine, and feces. · Use skin lotions to keep your skin from drying out and cracking. Barrier lotions or creams have ingredients that can act as a shield to help protect the skin from moisture or irritation. · Try not to slide or slump across sheets in a chair or bed. And try not to sleep in a recliner chair. Lifestyle choices  · Eat healthy foods with plenty of protein to help heal damaged skin and to help new skin grow. · Get plenty of fluids. · Stay at a healthy weight. Being either overweight or underweight can make pressure sores more likely. · If you smoke, stop. Smoking dries the skin and reduces its blood supply. If you need help quitting, talk to your doctor about stop-smoking programs and medicines. These can increase your chances of quitting for good. Ask about using cushions or pads  · Overlays are special pads you put on top of a mattress. They provide a softer surface that will fit your body's shape better than a regular mattress. · Cushions or devices can be used to reduce pressure on certain areas of the body. For example, you can use a \"medical heel pillow\" to help prevent pressure sores on heels. You can also get cushions for seating surfaces, such as wheelchair seats. Talk with your doctor about cushions and pads.  Some products, such as doughnut-type devices, may actually cause pressure sores or make them worse. Where can you learn more? Go to http://alannah-elsi.info/. Enter 828 [id number] in the search box to learn more about \"Learning About Preventing Pressure Sores. \"  Current as of: March 20, 2017  Content Version: 11.4  © 8263-2033 [employer]. Care instructions adapted under license by [employer] (which disclaims liability or warranty for this information). If you have questions about a medical condition or this instruction, always ask your healthcare professional. [address] any warranty or liability for your use of this information.

## 2018-02-20 NOTE — MR AVS SNAPSHOT
[address] [place] [address] [address] 
[phone] Patient: Philipp Barba MRN: [id number] Visit Information Date & Time Provider Department Dept. Phone Encounter #  
 2/20/2018 11:00 AM Dottie FrancoGloria 72 [phone] [id number] Upcoming Health Maintenance Date Due  
 FOOT EXAM Q1 2/10/2018 MEDICARE YEARLY EXAM 2/11/2018 MICROALBUMIN Q1 12/18/2018* HEMOGLOBIN A1C Q6M 5/13/2018 EYE EXAM RETINAL OR DILATED Q1 9/11/2018 LIPID PANEL Q1 11/13/2018 GLAUCOMA SCREENING Q2Y 9/11/2019 DTaP/Tdap/Td series (2 - Td) 8/11/2027 *Topic was postponed. The date shown is not the original due date. Allergies as of 2/20/2018  Review Complete On: 2/20/2018 By: Dottie Franco MD  
 No Known Allergies Current Immunizations  Reviewed on 12/18/2017 Name Date Influenza High Dose Vaccine PF 10/17/2017  3:48 PM  
 Influenza Vaccine 1/14/2017, 10/14/2015 10:02 AM, 10/9/2014 Pneumococcal Conjugate (PCV-13) 2/5/2016 11:03 AM  
 Pneumococcal Vaccine (Unspecified Type) 4/1/2013 Td 4/1/2013 Zoster Vaccine, Live 10/4/2012 Not reviewed this visit You Were Diagnosed With   
  
 Codes Comments Medicare annual wellness visit, subsequent    -  Primary ICD-10-CM: Z00.00 ICD-9-CM: V70.0 Type 2 diabetes mellitus without complication, without long-term current use of insulin (HCC)     ICD-10-CM: E11.9 ICD-9-CM: 250.00 Late onset Alzheimer's disease with behavioral disturbance     ICD-10-CM: G30.1, F02.81 ICD-9-CM: 331.0, 294.11 Left leg cellulitis     ICD-10-CM: L03.116 ICD-9-CM: 269. 6 Vitals BP Pulse Temp Resp Height(growth percentile) Weight(growth percentile) 130/80 (BP 1 Location: Left arm, BP Patient Position: Sitting) 100 97.4 °F (36.3 °C) (Oral) 14 5' 9\" (1.753 m) 132 lb (59.9 kg) SpO2 BMI Smoking Status 94% 19.49 kg/m2 Former Smoker BMI and BSA Data Body Mass Index Body Surface Area  
 19.49 kg/m 2 1.71 m 2 Preferred Pharmacy Pharmacy Name Phone [address], [address] AT [facility]  [address] [facility] [phone] Your Updated Medication List  
  
   
This list is accurate as of: 2/20/18 11:29 AM.  Always use your most recent med list.  
  
  
  
  
 aspirin delayed-release 81 mg tablet Take  by mouth daily. cephALEXin 500 mg capsule Commonly known as:  Lori Amezcua Take 1 Cap by mouth four (4) times daily for 10 days. dutasteride 0.5 mg capsule Commonly known as:  AVODART Take 1 Cap by mouth daily. foam bandage 9.2 X 9.2 \" Bndg Commonly known as:  [address]  
1 Each by Apply Externally route every seventy-two (72) hours. Indications: Sacral wound  
  
 furosemide 20 mg tablet Commonly known as:  LASIX Take 1 tablet in the morning PO with potassium for 5 days  Indications: Edema  
  
 glucose blood VI test strips strip Commonly known as:  ASCENSIA AUTODISC VI, ONE TOUCH ULTRA TEST VI  
100 Menthol-Zinc Oxide 0.44-20.6 % Oint Commonly known as:  Preet Pizza Apply 1 Each to affected area as needed. Indications: Moisture incontinence injury  
  
 potassium chloride 10 mEq tablet Commonly known as:  KLOR-CON Take 1 Tab by mouth daily for 5 days. With Furosemide  Indications: hypokalemia prevention  
  
 tamsulosin 0.4 mg capsule Commonly known as:  FLOMAX Take 1 Cap by mouth daily. triamcinolone acetonide 0.1 % topical cream  
Commonly known as:  KENALOG Apply  to affected area two (2) times a day. use thin layer  
  
 venlafaxine 75 mg tablet Commonly known as:  [place] [facility] Take 1 Tab by mouth two (2) times a day. Prescriptions Sent to Pharmacy  Refills  
 cephALEXin (KEFLEX) 500 mg capsule 0  
 Sig: Take 1 Cap by mouth four (4) times daily for 10 days. Class: Normal  
 Pharmacy: [facility], [address] [facility] [address] Ph #: [phone] Route: Oral  
  
We Performed the Following  DIABETES FOOT EXAM [7 Custom] Introducing [facility] & [facility] SERVICES! Dear Marisa Sis: Thank you for requesting a "[employer] account. Our records indicate that you already have an active "[employer] account. You can access your account anytime at [email or web address] Did you know that you can access your hospital and ER discharge instructions at any time in "[employer]? You can also review all of your test results from your hospital stay or ER visit. Additional Information If you have questions, please visit the Frequently Asked Questions section of the "[employer] website at [email or web address] Remember, "[employer] is NOT to be used for urgent needs. For medical emergencies, dial 911. Now available from your iPhone and Android! Please provide this summary of care documentation to your next provider. Your primary care clinician is listed as John Cintron. If you have any questions after today's visit, please call [phone].

## 2018-02-20 NOTE — PROGRESS NOTES
Chief Complaint   Patient presents with    Annual Wellness Visit         HPI:      Padmini Aiken is a 80 y.o. male. Progressive dementia over the past 3 years. Occasional difficulty with behavior. Appetite has picked up. Off all T2DM meds. Last A1c 5.3. Frequent urination, but urine culture (-). Some ankle edema, worse later in the day. Wife is working with a geriatric consultant. Seen a week ago by Tosin Alexandre for a skin tear      New Issues:  Due for SAWV    No Known Allergies    Current Outpatient Prescriptions   Medication Sig    furosemide (LASIX) 20 mg tablet Take 1 tablet in the morning PO with potassium for 5 days  Indications: Edema    potassium chloride (KLOR-CON) 10 mEq tablet Take 1 Tab by mouth daily for 5 days. With Furosemide  Indications: hypokalemia prevention    Menthol-Zinc Oxide (CALMOSEPTINE) 0.44-20.6 % oint Apply 1 Each to affected area as needed. Indications: Moisture incontinence injury    triamcinolone acetonide (KENALOG) 0.1 % topical cream Apply  to affected area two (2) times a day. use thin layer    dutasteride (AVODART) 0.5 mg capsule Take 1 Cap by mouth daily.  venlafaxine (EFFEXOR) 75 mg tablet Take 1 Tab by mouth two (2) times a day.  glucose blood VI test strips (ASCENSIA AUTODISC VI, ONE TOUCH ULTRA TEST VI) strip 100    tamsulosin (FLOMAX) 0.4 mg capsule Take 1 Cap by mouth daily.  aspirin delayed-release 81 mg tablet Take  by mouth daily.  foam bandage (MEPILEX BORDER SACRUM) 9.2 X 9.2 \" bndg 1 Each by Apply Externally route every seventy-two (72) hours. Indications: Sacral wound     No current facility-administered medications for this visit.         Past Medical History:   Diagnosis Date    BPH (benign prostatic hypertrophy) 1994    CAD (coronary artery disease) 2001    Dementia     DM (diabetes mellitus), type 2 ([facility] [address] [facility] 1995    HTN (hypertension)     Hyperlipidemia 2001    PVD (peripheral vascular disease) ([facility] [address] [facility]     diminished pulse left foot    Systolic murmur          ROS:  Denies fever, chills, cough, chest pain, SOB,  nausea, vomiting, or diarrhea. Denies wt loss, wt gain, hemoptysis, hematochezia or melena. Physical Examination:    /80 (BP 1 Location: Left arm, BP Patient Position: Sitting)  Pulse 100  Temp 97.4 °F (36.3 °C) (Oral)   Resp 14  Ht 5' 9\" (1.753 m)  Wt 132 lb (59.9 kg)  SpO2 94%  BMI 19.49 kg/m2    General: Alert and Ox name, Fluent speech  HEENT:  NC/AT, EOMI, OP: clear  Neck:  Supple, no adenopathy, JVD, mass or bruit  Chest:  Clear to Ausculation, without wheezes, rales, rubs or ronchi  Cardiac: RRR  Abdomen:  +BS, soft, nontender without palpable HSM  Extremities:  No cyanosis, clubbing  Neurologic:  Ambulatory without assist, CN 2-12 grossly intact. Moves all extremities. Skin:               Lymphadenopathy: no cervical or supraclavicular nodes  Diabetic Foot Exam:  See photos above    Lab Results   Component Value Date/Time    Hemoglobin A1c 5.3 11/13/2017 01:04 PM     ASSESSMENT AND PLAN:     1. BLE cellulitis:  Dress daily; start Keflex and elevate  2. Dementia:  I have urged his wife to consider placement at [facility]  3. [address]    Orders Placed This Encounter    HM DIABETES FOOT EXAM       Shruthi Tuttle MD, FACP        ______________________________________________________________________    Edil Kowalski is a 80 y.o. male and presents for annual Medicare Wellness Visit. Problem List: Reviewed with patient and discussed risk factors.     Patient Active Problem List   Diagnosis Code    Basal cell carcinoma of chest wall C44.519    CAD (coronary artery disease) I25.10    Hyperlipidemia E78.5    Advanced care planning/counseling discussion Z71.89    Type 2 diabetes mellitus without complication, without long-term current use of insulin ([facility] [address] E11.9       Current medical providers:  Patient Care Team:  Teresa Romero MD as PCP - General (Internal Medicine)    [facility], [address], Medications/Allergies: reviewed, on chart. Male Alcohol Screening: On any occasion during the past 3 months, have you had more than 4 drinks containing alcohol? No    Do you average more than 14 drinks per week? No    ROS:  Constitutional: No fever, chills or weight loss  Respiratory: No cough, SOB   CV: No chest pain or Palpitations    Objective:  Visit Vitals    /80 (BP 1 Location: Left arm, BP Patient Position: Sitting)    Pulse 100    Temp 97.4 °F (36.3 °C) (Oral)    Resp 14    Ht 5' 9\" (1.753 m)    Wt 132 lb (59.9 kg)    SpO2 94%    BMI 19.49 kg/m2    Body mass index is 19.49 kg/(m^2). Assessment of cognitive impairment: Alert and oriented x 3    Depression Screen:   PHQ over the last two weeks 2/20/2018   Little interest or pleasure in doing things Several days   Feeling down, depressed or hopeless Not at all   Total Score PHQ 2 1       Fall Risk Assessment:    Fall Risk Assessment, last 12 mths 12/18/2017   Able to walk? Yes   Fall in past 12 months? No   Fall with injury? -   Number of falls in past 12 months -   Fall Risk Score -       Functional Ability:   Does the patient exhibit a steady gait? yes   How long did it take the patient to get up and walk from a sitting position? 12 sec   Is the patient self reliant?  (ie can do own laundry, meals, household chores)  yes     Does the patient handle his/her own medications? yes     Does the patient handle his/her own money? yes     Is the patients home safe (ie good lighting, handrails on stairs and bath, etc.)? yes     Did you notice or did patient express any hearing difficulties? yes     Did you notice or did patient express any vision difficulties?    no       Advance Care Planning:   Patient was offered the opportunity to discuss advance care planning:  yes     Does patient have an Advance Directive:  yes   If no, did you provide information on Caring Connections?  no       Plan:      Orders Placed This Encounter    HM DIABETES FOOT EXAM       Health Maintenance   Topic Date Due    FOOT EXAM Q1  02/10/2018    MEDICARE YEARLY EXAM  02/11/2018    MICROALBUMIN Q1  12/18/2018 (Originally 9/15/1945)    HEMOGLOBIN A1C Q6M  05/13/2018    EYE EXAM RETINAL OR DILATED Q1  09/11/2018    LIPID PANEL Q1  11/13/2018    GLAUCOMA SCREENING Q2Y  09/11/2019    DTaP/Tdap/Td series (2 - Td) 08/11/2027    ZOSTER VACCINE AGE 60>  Completed    Pneumococcal 65+ Low/Medium Risk  Completed    Influenza Age 5 to Adult  Completed       *Patient verbalized understanding and agreement with the plan. A copy of the After Visit Summary with personalized health plan was given to the patient today.

## 2018-02-28 PROBLEM — L89.899: Status: ACTIVE | Noted: 2018-01-01

## 2018-02-28 PROBLEM — I50.9 CHF (CONGESTIVE HEART FAILURE) (HCC): Status: ACTIVE | Noted: 2018-01-01

## 2018-02-28 PROBLEM — F03.90 DEMENTIA (HCC): Status: ACTIVE | Noted: 2018-01-01

## 2018-02-28 PROBLEM — I87.2 VENOUS STASIS DERMATITIS OF BOTH LOWER EXTREMITIES: Status: ACTIVE | Noted: 2018-01-01

## 2018-02-28 PROBLEM — R53.1 WEAKNESS GENERALIZED: Status: ACTIVE | Noted: 2018-01-01

## 2018-02-28 PROBLEM — L89.302 DECUBITUS ULCER OF BUTTOCK, STAGE 2 (HCC): Status: ACTIVE | Noted: 2018-01-01

## 2018-02-28 PROBLEM — R53.1 WEAKNESS: Status: ACTIVE | Noted: 2018-01-01

## 2018-03-01 PROBLEM — R78.81 BACTEREMIA DUE TO GRAM-NEGATIVE BACTERIA: Status: ACTIVE | Noted: 2018-01-01

## 2018-03-01 PROBLEM — J18.9 CAP (COMMUNITY ACQUIRED PNEUMONIA): Status: ACTIVE | Noted: 2018-01-01

## 2018-03-04 PROBLEM — Z51.89 ENCOUNTER FOR REHABILITATION: Status: ACTIVE | Noted: 2018-01-01

## 2019-03-20 NOTE — MR AVS SNAPSHOT
NOTIFICATION RETURN TO WORK / SCHOOL 
 
3/20/2019 5:45 PM 
 
Ms. Lesly Leary [address] [address] To Whom It May Concern: 
 
Lesly Leary is currently under the care of [address] She will return to work/school on: 3/23/19 If there are questions or concerns please have the patient contact our office. Sincerely, Noelle Barney, NP 
 
 
                                
 
 Visit Information Date & Time Provider Department Dept. Phone Encounter #  
 8/8/2017 10:35 AM [facility] [place] [facility] [address] [id number] Your Appointments 8/11/2017 11:00 AM  
ESTABLISHED PATIENT with Sierra Ventura MD  
Clifton Martinez ([address]) Appt Note: 6 month f/u  
 [address] [facility] [address] [facility] [address] [phone]  
  
   
 [address] [facility] [address] [facility] [address] Upcoming Health Maintenance Date Due MICROALBUMIN Q1 9/15/1945 EYE EXAM RETINAL OR DILATED Q1 9/15/1945 GLAUCOMA SCREENING Q2Y 9/15/2000 DTaP/Tdap/Td series (1 - Tdap) 4/2/2013 INFLUENZA AGE 9 TO ADULT 8/1/2017 HEMOGLOBIN A1C Q6M 8/8/2017 LIPID PANEL Q1 2/8/2018 FOOT EXAM Q1 2/10/2018 MEDICARE YEARLY EXAM 2/11/2018 Allergies as of 8/8/2017  Review Complete On: 2/10/2017 By: Sierra Ventura MD  
 No Known Allergies Current Immunizations  Never Reviewed Name Date Influenza Vaccine 1/14/2017, 10/14/2015 10:02 AM, 10/9/2014 Pneumococcal Conjugate (PCV-13) 2/5/2016 11:03 AM  
 Pneumococcal Vaccine (Unspecified Type) 4/1/2013 Td 4/1/2013 Zoster Vaccine, Live 10/4/2012 Not reviewed this visit You Were Diagnosed With   
  
 Codes Comments Type 2 diabetes mellitus without complication, without long-term current use of insulin (HCC)    -  Primary ICD-10-CM: E11.9 ICD-9-CM: 250.00 Coronary artery disease with angina pectoris, unspecified vessel or lesion type, unspecified whether native or transplanted heart [facility])     ICD-10-CM: I25.119 ICD-9-CM: 414.00, 413.9 Mixed hyperlipidemia     ICD-10-CM: E78.2 ICD-9-CM: 272.2 Vitals Smoking Status Former Smoker Preferred Pharmacy Pharmacy Name Phone  [address] [phone] Your Updated Medication List  
  
   
 This list is accurate as of: 8/8/17 11:09 AM.  Always use your most recent med list. amLODIPine 10 mg tablet Commonly known as:  [facility] [place] Take 1 Tab by mouth daily. aspirin delayed-release 81 mg tablet Take  by mouth daily. bisoprolol-hydroCHLOROthiazide 5-6.25 mg per tablet Commonly known as:  [facility] Take 1 Tab by mouth daily. divalproex  mg tablet Commonly known as:  DEPAKOTE Take 1 Tab by mouth two (2) times a day. dutasteride 0.5 mg capsule Commonly known as:  AVODART TAKE 1 CAPSULE DAILY  
  
 glimepiride 1 mg tablet Commonly known as:  AMARYL Take 1 Tab by mouth Daily (before breakfast). glucose blood VI test strips strip Commonly known as:  ASCENSIA AUTODISC VI, ONE TOUCH ULTRA TEST VI  
100  
  
 metFORMIN 1,000 mg tablet Commonly known as:  GLUCOPHAGE  
TAKE 1 TABLET TWICE DAILY  WITH MEALS  
  
 omeprazole 20 mg capsule Commonly known as:  PRILOSEC Take 20 mg by mouth daily. quinapril 40 mg tablet Commonly known as:  ACCUPRIL Take 1 Tab by mouth daily. spironolactone 25 mg tablet Commonly known as:  ALDACTONE Take 1 Tab by mouth two (2) times a day. tamsulosin 0.4 mg capsule Commonly known as:  FLOMAX Take 1 Cap by mouth daily. We Performed the Following CBC WITH AUTOMATED DIFF [51072 CPT(R)] HEMOGLOBIN A1C WITH EAG [90082 CPT(R)] LIPID PANEL [98069 CPT(R)] METABOLIC PANEL, COMPREHENSIVE [87112 CPT(R)] Introducing hospitals & HEALTH SERVICES! Dear Milagro Ramires: Thank you for requesting a [employer] account. Our records indicate that you already have an active [email or web address] account. You can access your account anytime at [email or web address] Did you know that you can access your hospital and ER discharge instructions at any time in [employer]? You can also review all of your test results from your hospital stay or ER visit. Additional Information If you have questions, please visit the Frequently Asked Questions section of the [email or web address] website at [email or web address] Remember, [facility] is NOT to be used for urgent needs. For medical emergencies, dial 911. Now available from your iPhone and Android! Please provide this summary of care documentation to your next provider. Your primary care clinician is listed as Derrell Young. If you have any questions after today's visit, please call [phone].